# Patient Record
Sex: MALE | Race: BLACK OR AFRICAN AMERICAN | NOT HISPANIC OR LATINO | Employment: FULL TIME | URBAN - METROPOLITAN AREA
[De-identification: names, ages, dates, MRNs, and addresses within clinical notes are randomized per-mention and may not be internally consistent; named-entity substitution may affect disease eponyms.]

---

## 2020-02-28 ENCOUNTER — HOSPITAL ENCOUNTER (EMERGENCY)
Facility: HOSPITAL | Age: 30
Discharge: HOME | End: 2020-02-28
Attending: FAMILY MEDICINE

## 2020-02-28 VITALS
OXYGEN SATURATION: 95 % | SYSTOLIC BLOOD PRESSURE: 145 MMHG | BODY MASS INDEX: 47.83 KG/M2 | DIASTOLIC BLOOD PRESSURE: 70 MMHG | HEIGHT: 65 IN | HEART RATE: 96 BPM | WEIGHT: 287.06 LBS | RESPIRATION RATE: 20 BRPM | TEMPERATURE: 101 F

## 2020-02-28 DIAGNOSIS — H60.502 ACUTE OTITIS EXTERNA OF LEFT EAR, UNSPECIFIED TYPE: Primary | ICD-10-CM

## 2020-02-28 DIAGNOSIS — H65.02 NON-RECURRENT ACUTE SEROUS OTITIS MEDIA OF LEFT EAR: ICD-10-CM

## 2020-02-28 LAB
INFLUENZA A, MOLECULAR: NEGATIVE
INFLUENZA B, MOLECULAR: NEGATIVE
SPECIMEN SOURCE: NORMAL

## 2020-02-28 PROCEDURE — 25000003 PHARM REV CODE 250: Performed by: NURSE PRACTITIONER

## 2020-02-28 PROCEDURE — 96372 THER/PROPH/DIAG INJ SC/IM: CPT

## 2020-02-28 PROCEDURE — 99284 EMERGENCY DEPT VISIT MOD MDM: CPT | Mod: 25

## 2020-02-28 PROCEDURE — 87502 INFLUENZA DNA AMP PROBE: CPT

## 2020-02-28 PROCEDURE — 63600175 PHARM REV CODE 636 W HCPCS: Performed by: NURSE PRACTITIONER

## 2020-02-28 RX ORDER — CEFTRIAXONE 1 G/1
1 INJECTION, POWDER, FOR SOLUTION INTRAMUSCULAR; INTRAVENOUS
Status: COMPLETED | OUTPATIENT
Start: 2020-02-28 | End: 2020-02-28

## 2020-02-28 RX ORDER — CIPROFLOXACIN AND DEXAMETHASONE 3; 1 MG/ML; MG/ML
4 SUSPENSION/ DROPS AURICULAR (OTIC)
Status: COMPLETED | OUTPATIENT
Start: 2020-02-28 | End: 2020-02-28

## 2020-02-28 RX ORDER — AMOXICILLIN AND CLAVULANATE POTASSIUM 875; 125 MG/1; MG/1
1 TABLET, FILM COATED ORAL 2 TIMES DAILY
Qty: 14 TABLET | Refills: 0 | Status: SHIPPED | OUTPATIENT
Start: 2020-02-28 | End: 2023-08-20 | Stop reason: ALTCHOICE

## 2020-02-28 RX ORDER — DICLOFENAC SODIUM 50 MG/1
50 TABLET, DELAYED RELEASE ORAL 3 TIMES DAILY PRN
Qty: 15 TABLET | Refills: 0 | Status: SHIPPED | OUTPATIENT
Start: 2020-02-28 | End: 2021-03-30 | Stop reason: SDUPTHER

## 2020-02-28 RX ORDER — IBUPROFEN 800 MG/1
800 TABLET ORAL
Status: COMPLETED | OUTPATIENT
Start: 2020-02-28 | End: 2020-02-28

## 2020-02-28 RX ORDER — CIPROFLOXACIN AND DEXAMETHASONE 3; 1 MG/ML; MG/ML
4 SUSPENSION/ DROPS AURICULAR (OTIC) 2 TIMES DAILY
Qty: 7.5 ML | Refills: 0 | Status: SHIPPED | OUTPATIENT
Start: 2020-02-28 | End: 2021-03-30 | Stop reason: SDUPTHER

## 2020-02-28 RX ORDER — HYDROCODONE BITARTRATE AND ACETAMINOPHEN 5; 325 MG/1; MG/1
1 TABLET ORAL
Status: COMPLETED | OUTPATIENT
Start: 2020-02-28 | End: 2020-02-28

## 2020-02-28 RX ADMIN — IBUPROFEN 800 MG: 800 TABLET, FILM COATED ORAL at 10:02

## 2020-02-28 RX ADMIN — CEFTRIAXONE SODIUM 1 G: 1 INJECTION, POWDER, FOR SOLUTION INTRAMUSCULAR; INTRAVENOUS at 11:02

## 2020-02-28 RX ADMIN — CIPROFLOXACIN AND DEXAMETHASONE 4 DROP: 3; 1 SUSPENSION/ DROPS AURICULAR (OTIC) at 11:02

## 2020-02-28 RX ADMIN — HYDROCODONE BITARTRATE AND ACETAMINOPHEN 1 TABLET: 5; 325 TABLET ORAL at 10:02

## 2020-02-29 NOTE — ED PROVIDER NOTES
"SCRIBE #1 NOTE: I, Janessa Bocanegra, am scribing for, and in the presence of, Miguel East NP. I have scribed the entire note.      History      Chief Complaint   Patient presents with    Otalgia     L ear. Pt states "I think I ruptured my ear drum." C/O pain and fullness.        Review of patient's allergies indicates:  No Known Allergies     HPI   HPI    2/28/2020, 9:59 PM   History obtained from the patient      History of Present Illness: Pedro Melgar Jr. is a 29 y.o. male patient who presents to the Emergency Department for L ear pain, onset 1 day PTA. Pt states he may have ruptured his L ear drum.  Symptoms are constant and moderate in severity. No mitigating or exacerbating factors reported. Associated sxs include fever. Patient denies any rhinorrhea, cough, SOB, HA, dizziness, and all other sxs at this time. No prior Tx reported. No further complaints or concerns at this time.         Arrival mode: Personal vehicle      PCP: Primary Doctor No       Past Medical History:  History reviewed. No pertinent medical history.     Past Surgical History:  Past Surgical History:   Procedure Laterality Date    MOUTH SURGERY           Family History:  History reviewed. No pertinent family history.     Social History:  Social History     Tobacco Use    Smoking status: Current Some Day Smoker     Types: Cigarettes    Smokeless tobacco: Never Used   Substance and Sexual Activity    Alcohol use: Yes     Alcohol/week: 28.0 standard drinks     Types: 28 Cans of beer per week    Drug use: No    Sexual activity: unknown       ROS   Review of Systems   Constitutional: Positive for fever. Negative for chills.   HENT: Positive for ear pain (left). Negative for congestion, postnasal drip, rhinorrhea and sore throat.    Respiratory: Negative for cough and shortness of breath.    Cardiovascular: Negative for chest pain.   Gastrointestinal: Negative for nausea.   Genitourinary: Negative for dysuria.   Musculoskeletal: " "Negative for back pain.   Skin: Negative for rash.   Neurological: Negative for dizziness, weakness, light-headedness and headaches.   Hematological: Does not bruise/bleed easily.   All other systems reviewed and are negative.      Physical Exam      Initial Vitals [02/28/20 2139]   BP Pulse Resp Temp SpO2   (!) 145/70 96 20 (!) 101 °F (38.3 °C) 95 %      MAP       --          Physical Exam  Nursing Notes and Vital Signs Reviewed.  Constitutional: Patient is in mild distress. Well-developed and well-nourished. Obese. Warm to touch.  Head: Atraumatic. Normocephalic.  Eyes: PERRL. EOM intact. Conjunctivae are not pale. No scleral icterus.  ENT: Mucous membranes are moist. Oropharynx is clear and symmetric.    Ears: Right TM normal. Left ear canal swelling with pus. Erythema to left TM noted. No bulging. No effusion or air-fluid levels. No perforation.   Neck: Supple. Full ROM. No lymphadenopathy.  Cardiovascular: Regular rate. Regular rhythm. No murmurs, rubs, or gallops. Distal pulses are 2+ and symmetric.  Pulmonary/Chest: No respiratory distress. Clear to auscultation bilaterally. No wheezing or rales.  Abdominal: Soft and non-distended.  There is no tenderness.  No rebound, guarding, or rigidity. Good bowel sounds.  Musculoskeletal: Moves all extremities. No obvious deformities. No edema. No calf tenderness.  Skin: Warm and dry.  Neurological:  Alert, awake, and appropriate.  Normal speech.  No acute focal neurological deficits are appreciated.  Psychiatric: Normal affect. Good eye contact. Appropriate in content.    ED Course    Procedures  ED Vital Signs:  Vitals:    02/28/20 2139 02/28/20 2215 02/28/20 2255   BP: (!) 145/70     Pulse: 96     Resp: 20     Temp: (!) 101 °F (38.3 °C) (!) 101 °F (38.3 °C) (P) 100.1 °F (37.8 °C)   TempSrc: Oral     SpO2: 95%     Weight: 130.2 kg (287 lb 0.6 oz)     Height: 5' 5" (1.651 m)         Abnormal Lab Results:  Labs Reviewed   INFLUENZA A & B BY MOLECULAR        All Lab " Results:  Results for orders placed or performed during the hospital encounter of 02/28/20   Influenza A & B by Molecular   Result Value Ref Range    Influenza A, Molecular Negative Negative    Influenza B, Molecular Negative Negative    Flu A & B Source Nasal swab          Imaging Results:  Imaging Results    None                 The Emergency Provider reviewed the vital signs and test results, which are outlined above.    ED Discussion     10:45 PM: Reassessed pt at this time.   Discussed with pt all pertinent ED information and results. Discussed pt dx and plan of tx. Gave pt all f/u and return to the ED instructions. All questions and concerns were addressed at this time. Pt expresses understanding of information and instructions, and is comfortable with plan to discharge. Pt is stable for discharge.    I discussed with patient and/or family/caretaker that evaluation in the ED does not suggest any emergent or life threatening medical conditions requiring immediate intervention beyond what was provided in the ED, and I believe patient is safe for discharge.  Regardless, an unremarkable evaluation in the ED does not preclude the development or presence of a serious of life threatening condition. As such, patient was instructed to return immediately for any worsening or change in current symptoms.           ED Medication(s):  Medications   HYDROcodone-acetaminophen 5-325 mg per tablet 1 tablet (1 tablet Oral Given 2/28/20 2215)   ibuprofen tablet 800 mg (800 mg Oral Given 2/28/20 2215)   cefTRIAXone injection 1 g (1 g Intramuscular Given 2/28/20 2309)   ciprofloxacin-dexamethasone 0.3-0.1% otic suspension 4 drop (4 drops Left Ear Given 2/28/20 2333)       Follow-up Information     Schedule an appointment as soon as possible for a visit  with [facility].    Specialty:  Otolaryngology  Contact information:  [address] [facility] [address] [phone]  Additional  information:  (off [address]) 2nd floor           Schedule an appointment as soon as possible for a visit  with PCP.           [facility]    Specialty:  Emergency Medicine  Why:  If symptoms worsen, As needed  Contact information:  [address]  [facility] [address] [phone]                 Discharge Medication List as of 2/28/2020 11:02 PM      START taking these medications    Details   amoxicillin-clavulanate 875-125mg (AUGMENTIN) 875-125 mg per tablet Take 1 tablet by mouth 2 (two) times daily., Starting Fri 2/28/2020, Print      ciprofloxacin-dexamethasone 0.3-0.1% (CIPRODEX) 0.3-0.1 % DrpS Place 4 drops into the left ear 2 (two) times daily., Starting Fri 2/28/2020, Print      diclofenac (VOLTAREN) 50 MG EC tablet Take 1 tablet (50 mg total) by mouth 3 (three) times daily as needed., Starting Fri 2/28/2020, Print             Medical Decision Making    Medical Decision Making:   Clinical Tests:   Lab Tests: Ordered and Reviewed           Scribe Attestation:   Scribe #1: I performed the above scribed service and the documentation accurately describes the services I performed. I attest to the accuracy of the note.    Attending:   Physician Attestation Statement for Scribe #1: I,Miguel East NP , personally performed the services described in this documentation, as scribed by Janessa Bocanegra, in my presence, and it is both accurate and complete.          Clinical Impression       ICD-10-CM ICD-9-CM   1. Acute otitis externa of left ear, unspecified type H60.502 380.10   2. Non-recurrent acute serous otitis media of left ear H65.02 381.01       Disposition:   Disposition: Discharged  Condition: Stable         Miguel East NP  02/29/20 4785

## 2020-12-23 ENCOUNTER — OFFICE VISIT (OUTPATIENT)
Dept: URGENT CARE | Facility: CLINIC | Age: 30
End: 2020-12-23
Payer: COMMERCIAL

## 2020-12-23 VITALS
TEMPERATURE: 98 F | SYSTOLIC BLOOD PRESSURE: 129 MMHG | RESPIRATION RATE: 16 BRPM | OXYGEN SATURATION: 99 % | HEART RATE: 81 BPM | DIASTOLIC BLOOD PRESSURE: 72 MMHG

## 2020-12-23 DIAGNOSIS — M72.2 PLANTAR FASCIITIS, RIGHT: Primary | ICD-10-CM

## 2020-12-23 PROCEDURE — 99204 OFFICE O/P NEW MOD 45 MIN: CPT | Mod: S$GLB,,, | Performed by: NURSE PRACTITIONER

## 2020-12-23 PROCEDURE — 1125F PR PAIN SEVERITY QUANTIFIED, PAIN PRESENT: ICD-10-PCS | Mod: S$GLB,,, | Performed by: NURSE PRACTITIONER

## 2020-12-23 PROCEDURE — 99204 PR OFFICE/OUTPT VISIT, NEW, LEVL IV, 45-59 MIN: ICD-10-PCS | Mod: S$GLB,,, | Performed by: NURSE PRACTITIONER

## 2020-12-23 PROCEDURE — 1125F AMNT PAIN NOTED PAIN PRSNT: CPT | Mod: S$GLB,,, | Performed by: NURSE PRACTITIONER

## 2020-12-23 RX ORDER — INDOMETHACIN 75 MG/1
75 CAPSULE, EXTENDED RELEASE ORAL 2 TIMES DAILY
Qty: 20 CAPSULE | Refills: 0 | Status: SHIPPED | OUTPATIENT
Start: 2020-12-23 | End: 2021-01-02

## 2020-12-23 RX ORDER — INDOMETHACIN 75 MG/1
75 CAPSULE, EXTENDED RELEASE ORAL 2 TIMES DAILY
COMMUNITY

## 2020-12-23 NOTE — PROGRESS NOTES
Subjective:       Patient ID: Pedro Costa is a 30 y.o. male.    Vitals:  temporal temperature is 97.7 °F (36.5 °C). His blood pressure is 129/72 and his pulse is 81. His respiration is 16 and oxygen saturation is 99%.     Chief Complaint: Plantar Fasciitis    Right foot pain    Other  This is a new problem. The current episode started in the past 7 days. Pertinent negatives include no arthralgias, chest pain, chills, congestion, coughing, fatigue, fever, headaches, joint swelling, myalgias, nausea, rash, sore throat, vertigo or vomiting. Treatments tried: indomethacin. The treatment provided mild relief.       Constitution: Negative for chills, fatigue and fever.   HENT: Negative for congestion and sore throat.    Neck: Negative for painful lymph nodes.   Cardiovascular: Negative for chest pain and leg swelling.   Eyes: Negative for double vision and blurred vision.   Respiratory: Negative for cough and shortness of breath.    Gastrointestinal: Negative for nausea, vomiting and diarrhea.   Genitourinary: Negative for dysuria, frequency and urgency.   Musculoskeletal: Positive for pain (foot). Negative for joint pain, joint swelling, muscle cramps and muscle ache.   Skin: Negative for color change, pale and rash.   Allergic/Immunologic: Negative for seasonal allergies.   Neurological: Negative for dizziness, history of vertigo, light-headedness, passing out and headaches.   Hematologic/Lymphatic: Negative for swollen lymph nodes, easy bruising/bleeding and history of blood clots. Does not bruise/bleed easily.   Psychiatric/Behavioral: Negative for nervous/anxious, sleep disturbance and depression. The patient is not nervous/anxious.        Objective:      Physical Exam   Constitutional: He is oriented to person, place, and time. He appears well-developed. He is cooperative. No distress.   HENT:   Head: Normocephalic and atraumatic.   Nose: Nose normal.   Mouth/Throat: Oropharynx is clear and moist and mucous  membranes are normal.   Eyes: Conjunctivae and lids are normal.   Neck: Trachea normal, normal range of motion, full passive range of motion without pain and phonation normal. Neck supple.   Cardiovascular: Normal rate, regular rhythm, normal heart sounds and normal pulses.   Pulmonary/Chest: Effort normal and breath sounds normal. No respiratory distress. He has no decreased breath sounds.   Abdominal: Normal appearance.   Musculoskeletal:         General: No deformity.      Right foot: No swelling.        Feet:    Neurological: He is alert and oriented to person, place, and time. He has normal strength and normal reflexes. No sensory deficit.   Skin: Skin is warm, dry, intact and not diaphoretic. Psychiatric: His speech is normal and behavior is normal. Judgment and thought content normal.   Nursing note and vitals reviewed.        Assessment:       1. Plantar fasciitis, right        Plan:         Plantar fasciitis, right  -     indomethacin (INDOCIN SR) 75 mg CpSR CR capsule; Take 1 capsule (75 mg total) by mouth 2 (two) times daily. for 10 days  Dispense: 20 capsule; Refill: 0         Home care  · If you are overweight, lose weight to help healing.  · Choose supportive shoes with good arch support and shock absorbency. Replace athletic shoes when they become worn out. Dont walk or run barefoot.  · Premade or custom-fitted shoe inserts may be helpful. Inserts made of silicone seem to be the most effective. Custom-made inserts can be provided by a podiatrist or foot specialist, physical therapist, or orthopedist.  · Premade or custom-made night splints keep the heel stretched out while you sleep. They may prevent morning pain.  · Avoid activities that stress the feet: jogging, prolonged standing or walking, contact sports, etc.  · First thing in the morning and before sports, stretch the bottom of your feet. Gently flex your ankle so the toes move toward your knee.  · Icing may help control heel pain. Apply an  ice pack to the heel for 10-20 minutes as a preventive. Or ice your heel after a severe flare-up of symptoms. You may repeat this every 1-2 hours as needed.  · You may use over-the-counter pain medicine to control pain, unless another medicine was prescribed. Anti-inflammatory pain medicines, such as ibuprofen or naproxen, may work better than acetaminophen. If you have chronic liver or kidney disease or ever had a stomach ulcer or GI bleeding, talk with your healthcare provider before using these medicines.    Follow up with PCP or podiatry if symptoms worsen or don't improve with treatment.

## 2020-12-27 ENCOUNTER — TELEPHONE (OUTPATIENT)
Dept: URGENT CARE | Facility: CLINIC | Age: 30
End: 2020-12-27

## 2021-03-30 ENCOUNTER — HOSPITAL ENCOUNTER (EMERGENCY)
Facility: HOSPITAL | Age: 31
Discharge: HOME | End: 2021-03-30
Attending: EMERGENCY MEDICINE

## 2021-03-30 VITALS
WEIGHT: 297.63 LBS | HEART RATE: 89 BPM | HEIGHT: 65 IN | SYSTOLIC BLOOD PRESSURE: 152 MMHG | TEMPERATURE: 99 F | OXYGEN SATURATION: 95 % | RESPIRATION RATE: 18 BRPM | DIASTOLIC BLOOD PRESSURE: 76 MMHG | BODY MASS INDEX: 49.59 KG/M2

## 2021-03-30 DIAGNOSIS — H60.502 ACUTE OTITIS EXTERNA OF LEFT EAR, UNSPECIFIED TYPE: Primary | ICD-10-CM

## 2021-03-30 DIAGNOSIS — H92.02 LEFT EAR PAIN: ICD-10-CM

## 2021-03-30 PROCEDURE — 99284 EMERGENCY DEPT VISIT MOD MDM: CPT

## 2021-03-30 RX ORDER — CIPROFLOXACIN AND DEXAMETHASONE 3; 1 MG/ML; MG/ML
4 SUSPENSION/ DROPS AURICULAR (OTIC) 2 TIMES DAILY
Qty: 7.5 ML | Refills: 0 | Status: SHIPPED | OUTPATIENT
Start: 2021-03-30

## 2021-03-30 RX ORDER — DICLOFENAC SODIUM 50 MG/1
50 TABLET, DELAYED RELEASE ORAL 3 TIMES DAILY PRN
Qty: 15 TABLET | Refills: 0 | Status: SHIPPED | OUTPATIENT
Start: 2021-03-30

## 2021-07-22 ENCOUNTER — OFFICE VISIT (OUTPATIENT)
Dept: URGENT CARE | Facility: CLINIC | Age: 31
End: 2021-07-22
Payer: COMMERCIAL

## 2021-07-22 VITALS
DIASTOLIC BLOOD PRESSURE: 86 MMHG | OXYGEN SATURATION: 96 % | RESPIRATION RATE: 20 BRPM | TEMPERATURE: 99 F | HEART RATE: 99 BPM | SYSTOLIC BLOOD PRESSURE: 142 MMHG

## 2021-07-22 DIAGNOSIS — J06.9 UPPER RESPIRATORY INFECTION WITH COUGH AND CONGESTION: Primary | ICD-10-CM

## 2021-07-22 DIAGNOSIS — R50.9 FEVER, UNSPECIFIED FEVER CAUSE: ICD-10-CM

## 2021-07-22 LAB
CTP QC/QA: YES
SARS-COV-2 RDRP RESP QL NAA+PROBE: NEGATIVE

## 2021-07-22 PROCEDURE — 99214 PR OFFICE/OUTPT VISIT, EST, LEVL IV, 30-39 MIN: ICD-10-PCS | Mod: S$GLB,,, | Performed by: NURSE PRACTITIONER

## 2021-07-22 PROCEDURE — 1126F PR PAIN SEVERITY QUANTIFIED, NO PAIN PRESENT: ICD-10-PCS | Mod: CPTII,S$GLB,, | Performed by: NURSE PRACTITIONER

## 2021-07-22 PROCEDURE — 99214 OFFICE O/P EST MOD 30 MIN: CPT | Mod: S$GLB,,, | Performed by: NURSE PRACTITIONER

## 2021-07-22 PROCEDURE — U0002: ICD-10-PCS | Mod: QW,S$GLB,, | Performed by: NURSE PRACTITIONER

## 2021-07-22 PROCEDURE — 1126F AMNT PAIN NOTED NONE PRSNT: CPT | Mod: CPTII,S$GLB,, | Performed by: NURSE PRACTITIONER

## 2021-07-22 PROCEDURE — U0002 COVID-19 LAB TEST NON-CDC: HCPCS | Mod: QW,S$GLB,, | Performed by: NURSE PRACTITIONER

## 2021-07-22 RX ORDER — AZITHROMYCIN 250 MG/1
TABLET, FILM COATED ORAL
Qty: 6 TABLET | Refills: 0 | Status: SHIPPED | OUTPATIENT
Start: 2021-07-22

## 2022-04-15 ENCOUNTER — OFFICE VISIT (OUTPATIENT)
Dept: URGENT CARE | Facility: CLINIC | Age: 32
End: 2022-04-15
Payer: COMMERCIAL

## 2022-04-15 VITALS
WEIGHT: 260 LBS | HEIGHT: 67 IN | SYSTOLIC BLOOD PRESSURE: 125 MMHG | OXYGEN SATURATION: 97 % | HEART RATE: 78 BPM | BODY MASS INDEX: 40.81 KG/M2 | RESPIRATION RATE: 16 BRPM | TEMPERATURE: 97 F | DIASTOLIC BLOOD PRESSURE: 86 MMHG

## 2022-04-15 DIAGNOSIS — Z11.3 SCREENING EXAMINATION FOR STD (SEXUALLY TRANSMITTED DISEASE): Primary | ICD-10-CM

## 2022-04-15 PROCEDURE — 3074F PR MOST RECENT SYSTOLIC BLOOD PRESSURE < 130 MM HG: ICD-10-PCS | Mod: CPTII,S$GLB,, | Performed by: PHYSICIAN ASSISTANT

## 2022-04-15 PROCEDURE — 36415 COLL VENOUS BLD VENIPUNCTURE: CPT | Performed by: PHYSICIAN ASSISTANT

## 2022-04-15 PROCEDURE — 3074F SYST BP LT 130 MM HG: CPT | Mod: CPTII,S$GLB,, | Performed by: PHYSICIAN ASSISTANT

## 2022-04-15 PROCEDURE — 3079F PR MOST RECENT DIASTOLIC BLOOD PRESSURE 80-89 MM HG: ICD-10-PCS | Mod: CPTII,S$GLB,, | Performed by: PHYSICIAN ASSISTANT

## 2022-04-15 PROCEDURE — 1160F RVW MEDS BY RX/DR IN RCRD: CPT | Mod: CPTII,S$GLB,, | Performed by: PHYSICIAN ASSISTANT

## 2022-04-15 PROCEDURE — 87491 CHLMYD TRACH DNA AMP PROBE: CPT | Performed by: PHYSICIAN ASSISTANT

## 2022-04-15 PROCEDURE — 99214 OFFICE O/P EST MOD 30 MIN: CPT | Mod: S$GLB,,, | Performed by: PHYSICIAN ASSISTANT

## 2022-04-15 PROCEDURE — 86592 SYPHILIS TEST NON-TREP QUAL: CPT | Performed by: PHYSICIAN ASSISTANT

## 2022-04-15 PROCEDURE — 3079F DIAST BP 80-89 MM HG: CPT | Mod: CPTII,S$GLB,, | Performed by: PHYSICIAN ASSISTANT

## 2022-04-15 PROCEDURE — 1159F MED LIST DOCD IN RCRD: CPT | Mod: CPTII,S$GLB,, | Performed by: PHYSICIAN ASSISTANT

## 2022-04-15 PROCEDURE — 3008F BODY MASS INDEX DOCD: CPT | Mod: CPTII,S$GLB,, | Performed by: PHYSICIAN ASSISTANT

## 2022-04-15 PROCEDURE — 99214 PR OFFICE/OUTPT VISIT, EST, LEVL IV, 30-39 MIN: ICD-10-PCS | Mod: S$GLB,,, | Performed by: PHYSICIAN ASSISTANT

## 2022-04-15 PROCEDURE — 3008F PR BODY MASS INDEX (BMI) DOCUMENTED: ICD-10-PCS | Mod: CPTII,S$GLB,, | Performed by: PHYSICIAN ASSISTANT

## 2022-04-15 PROCEDURE — 87529 HSV DNA AMP PROBE: CPT | Performed by: PHYSICIAN ASSISTANT

## 2022-04-15 PROCEDURE — 1159F PR MEDICATION LIST DOCUMENTED IN MEDICAL RECORD: ICD-10-PCS | Mod: CPTII,S$GLB,, | Performed by: PHYSICIAN ASSISTANT

## 2022-04-15 PROCEDURE — 87591 N.GONORRHOEAE DNA AMP PROB: CPT | Performed by: PHYSICIAN ASSISTANT

## 2022-04-15 PROCEDURE — 87389 HIV-1 AG W/HIV-1&-2 AB AG IA: CPT | Performed by: PHYSICIAN ASSISTANT

## 2022-04-15 PROCEDURE — 1160F PR REVIEW ALL MEDS BY PRESCRIBER/CLIN PHARMACIST DOCUMENTED: ICD-10-PCS | Mod: CPTII,S$GLB,, | Performed by: PHYSICIAN ASSISTANT

## 2022-04-15 RX ORDER — AMLODIPINE BESYLATE 5 MG/1
TABLET ORAL
COMMUNITY
Start: 2021-11-24

## 2022-04-15 NOTE — PATIENT INSTRUCTIONS
- We will call you with test results within about the next week  - Please use condoms for future sexual intercourse  - Please seek medical attention if you begin to experience any new symptoms.     Increase condom use to prevent infection & Notify sexual partners of the need for testing if you are positive.       For those who have high risk sexual behaviors and are on Truvada for PrEP- you have additional protection against HIV ONLY.  REMEMBER WEAR CONDOMS AND GET TESTED OFTEN.       - You must understand that you have received an Urgent Care treatment only and that you may be released before all of your medical problems are known or treated.   - You, the patient, will arrange for follow up care as instructed with your primary care provider or recommended specialist.   - If your condition worsens or fails to improve we recommend that you receive another evaluation at the ER immediately or contact your PCP to discuss your concerns, or return here.   - Please do not drive or make any important decisions for 24 hours if you have received any pain medications, sedatives or mood altering drugs during your visit.    Disclaimer: This document was drafted with the use of a voice recognition device and is likely to have sound alike errors.

## 2022-04-15 NOTE — PROGRESS NOTES
"Subjective:       Patient ID: Pedro Costa is a 31 y.o. male.    Vitals:  height is 5' 7" (1.702 m) and weight is 117.9 kg (260 lb). His temperature is 97.4 °F (36.3 °C). His blood pressure is 125/86 and his pulse is 78. His respiration is 16 and oxygen saturation is 97%.     Chief Complaint: Annual Exam    31-year-old male presents to urgent care for full STD screening.  enies any known exposures or symptoms.  Reports that he just would like to know his status.        Genitourinary: Negative.  Negative for dysuria, frequency, urgency, urine decreased, flank pain, bladder incontinence, bed wetting, hematuria, history of kidney stones, genital trauma, painful intercourse, genital sore, penile discharge, painful ejaculation, penile pain, penile swelling, scrotal swelling, testicular pain and pelvic pain.       Objective:       Vitals:    04/15/22 1115   BP: 125/86   Pulse: 78   Resp: 16   Temp: 97.4 °F (36.3 °C)   SpO2: 97%   Weight: 117.9 kg (260 lb)   Height: 5' 7" (1.702 m)       Physical Exam   Constitutional: He is oriented to person, place, and time.  Non-toxic appearance. He does not appear ill. No distress. obesity  HENT:   Head: Normocephalic and atraumatic.   Ears:   Right Ear: External ear normal.   Left Ear: External ear normal.   Nose: Nose normal.   Eyes: Conjunctivae are normal. Pupils are equal, round, and reactive to light. No scleral icterus.      extraocular movement intact   Neck: Neck supple.   Cardiovascular: Normal rate, regular rhythm, normal heart sounds and normal pulses.   Pulmonary/Chest: Effort normal and breath sounds normal.   Abdominal: Normal appearance.   Genitourinary:         Comments: declined     Neurological: no focal deficit. He is alert, oriented to person, place, and time and at baseline.   Skin: Skin is warm, dry, not diaphoretic and no rash. Capillary refill takes less than 2 seconds.   Psychiatric: His behavior is normal. Mood, judgment and thought content normal. "   Vitals reviewed.        Assessment:       1. Screening examination for STD (sexually transmitted disease)          Plan:         Screening examination for STD (sexually transmitted disease)  -     C. trachomatis/N. gonorrhoeae by AMP DNA [facility]; Urine  -     HIV 1/2 Ag/Ab (4th Gen)  -     Herpes simplex Virus (HSV) Type 1 & 2 DNA by PCR  -     RPR                 Patient Instructions       - We will call you with test results within about the next week  - Please use condoms for future sexual intercourse  - Please seek medical attention if you begin to experience any new symptoms.     Increase condom use to prevent infection & Notify sexual partners of the need for testing if you are positive.       For those who have high risk sexual behaviors and are on Truvada for PrEP- you have additional protection against HIV ONLY.  REMEMBER WEAR CONDOMS AND GET TESTED OFTEN.       - You must understand that you have received an Urgent Care treatment only and that you may be released before all of your medical problems are known or treated.   - You, the patient, will arrange for follow up care as instructed with your primary care provider or recommended specialist.   - If your condition worsens or fails to improve we recommend that you receive another evaluation at the ER immediately or contact your PCP to discuss your concerns, or return here.   - Please do not drive or make any important decisions for 24 hours if you have received any pain medications, sedatives or mood altering drugs during your visit.    Disclaimer: This document was drafted with the use of a voice recognition device and is likely to have sound alike errors.

## 2022-04-18 LAB
HIV 1+2 AB+HIV1 P24 AG SERPL QL IA: NEGATIVE
RPR SER QL: NORMAL

## 2022-04-19 LAB
C TRACH DNA SPEC QL NAA+PROBE: NOT DETECTED
N GONORRHOEA DNA SPEC QL NAA+PROBE: NOT DETECTED

## 2022-04-20 ENCOUNTER — TELEPHONE (OUTPATIENT)
Dept: URGENT CARE | Facility: CLINIC | Age: 32
End: 2022-04-20
Payer: COMMERCIAL

## 2022-04-20 LAB
HSV-1 DNA BY PCR: NEGATIVE
HSV-2 DNA BY PCR: NEGATIVE

## 2022-04-20 NOTE — TELEPHONE ENCOUNTER
Full STD panel negative.  Attempted to call patient and notify of results below.    No answer, voicemail left to return call to urgent care.      Results for orders placed or performed in visit on 04/15/22   C. trachomatis/N. gonorrhoeae by AMP DNA [facility]; Urine    Specimen: Genital   Result Value Ref Range    Chlamydia, Amplified DNA Not Detected Not Detected    N gonorrhoeae, amplified DNA Not Detected Not Detected   HIV 1/2 Ag/Ab (4th Gen)   Result Value Ref Range    HIV 1/2 Ag/Ab Negative Negative   Herpes simplex Virus (HSV) Type 1 & 2 DNA by PCR   Result Value Ref Range    HSV-1 DNA by PCR Negative Negative    HSV-2 DNA by PCR Negative Negative   RPR   Result Value Ref Range    RPR Non-reactive Non-reactive

## 2022-05-30 ENCOUNTER — HOSPITAL ENCOUNTER (EMERGENCY)
Facility: HOSPITAL | Age: 32
Discharge: HOME | End: 2022-05-30
Attending: EMERGENCY MEDICINE

## 2022-05-30 VITALS
DIASTOLIC BLOOD PRESSURE: 84 MMHG | HEART RATE: 79 BPM | TEMPERATURE: 99 F | WEIGHT: 281.06 LBS | HEIGHT: 64 IN | BODY MASS INDEX: 47.98 KG/M2 | SYSTOLIC BLOOD PRESSURE: 148 MMHG | OXYGEN SATURATION: 97 % | RESPIRATION RATE: 18 BRPM

## 2022-05-30 DIAGNOSIS — G51.0 BELL'S PALSY: Primary | ICD-10-CM

## 2022-05-30 PROCEDURE — 99284 EMERGENCY DEPT VISIT MOD MDM: CPT | Mod: 25

## 2022-05-30 RX ORDER — PREDNISONE 20 MG/1
40 TABLET ORAL DAILY
Qty: 10 TABLET | Refills: 0 | Status: SHIPPED | OUTPATIENT
Start: 2022-05-30 | End: 2022-06-04

## 2022-05-30 NOTE — ED PROVIDER NOTES
SCRIBE #1 NOTE: I, Dana Horan, am scribing for, and in the presence of, Jason Hough Jr., MD. I have scribed the entire note.       History     Chief Complaint   Patient presents with    left side facial numbness     Pt has had left side facial numbness x 3 days. There is no facial droop, but when he smiles, there is a difference. No slurred speech. No arm drift.     Review of patient's allergies indicates:  No Known Allergies      History of Present Illness     HPI    5/30/2022, 10:13 AM  History obtained from the patient and wife      History of Present Illness: Pedro Melgar Jr. is a 32 y.o. male patient who presents to the Emergency Department for evaluation of left sided facial numbness which onset gradually 3 days ago. Pt reports that it is harder to move the left side of his face. There is no facial droop, but pt can notice a difference when smiling or when closing his eyes. Pt's wife reports that when the sxs first began, pt had slurred speech but it has resolved.  Symptoms are constant and moderate in severity. No mitigating or exacerbating factors reported. Associated sxs include drooling and taste change. Patient denies any fever, nausea, SOB, CP, weakness, and all other sxs at this time. No further complaints or concerns at this time.       Arrival mode: Personal vehicle    PCP: Primary Doctor No        Past Medical History:  No past medical history on file.    Past Surgical History:  Past Surgical History:   Procedure Laterality Date    MOUTH SURGERY           Family History:  No family history on file.    Social History:  Social History     Tobacco Use    Smoking status: Current Some Day Smoker     Types: Cigarettes    Smokeless tobacco: Never Used   Substance and Sexual Activity    Alcohol use: Yes     Alcohol/week: 28.0 standard drinks     Types: 28 Cans of beer per week    Drug use: No    Sexual activity: Not on file        Review of Systems     Review of Systems   Constitutional:  Negative for fever.        (+) Taste change   HENT: Positive for drooling. Negative for sore throat.    Respiratory: Negative for shortness of breath.    Cardiovascular: Negative for chest pain.   Gastrointestinal: Negative for nausea.   Genitourinary: Negative for dysuria.   Musculoskeletal: Negative for back pain.   Skin: Negative for rash.   Neurological: Positive for numbness (Left side of face). Negative for weakness.   Hematological: Does not bruise/bleed easily.   All other systems reviewed and are negative.       Physical Exam     Initial Vitals [05/30/22 0939]   BP Pulse Resp Temp SpO2   136/83 78 18 99.1 °F (37.3 °C) 97 %      MAP       --          Physical Exam  Nursing Notes and Vital Signs Reviewed.  Constitutional: Patient is in no acute distress. Well-developed and well-nourished.  Head: Atraumatic. Normocephalic.  Eyes: EOM intact. Conjunctivae are not pale. No scleral icterus.  ENT: Mucous membranes are moist. Oropharynx is clear and symmetric.    Neck: Supple. Full ROM.   Cardiovascular: Regular rate. Regular rhythm. No murmurs, rubs, or gallops. Distal pulses are 2+ and symmetric.  Pulmonary/Chest: No respiratory distress. Clear to auscultation bilaterally. No wheezing or rales.  Abdominal: Soft and non-distended.  There is no tenderness.  No rebound, guarding, or rigidity.   Musculoskeletal: Moves all extremities. No obvious deformities. No edema.   Skin: Warm and dry.  Neurological:  Alert, awake, and appropriate.  Normal speech. Paralysis of left forehead. Lid lag in left eye. Wasn't able to fully close left eye when compared to to the right. Bunking left nasolabial fold. Normal strength to extremities  Psychiatric: Normal affect. Good eye contact. Appropriate in content.     ED Course   Procedures  ED Vital Signs:  Vitals:    05/30/22 0939 05/30/22 1015 05/30/22 1115   BP: 136/83 (!) 156/88 (!) 148/84   Pulse: 78 72 79   Resp: 18 18 18   Temp: 99.1 °F (37.3 °C)  98.9 °F (37.2 °C)   TempSrc:  "Oral  Oral   SpO2: 97% 96% 97%   Weight: 127.5 kg (281 lb 1.4 oz)     Height: 5' 4" (1.626 m)           Imaging Results:  Imaging Results          CT Head Without Contrast (Final result)  Result time 05/30/22 10:44:50    Final result by Yosvany Whitley MD (05/30/22 10:44:50)                 Impression:      1.  Negative for acute intracranial process. Negative for hemorrhage, or skull fracture.    All CT scans at this facility are performed  using dose modulation techniques as appropriate to performed exam including the following:  automated exposure control; adjustment of mA and/or kV according to the patients size (this includes techniques or standardized protocols for targeted exams where dose is matched to indication/reason for exam: i.e. extremities or head);  iterative reconstruction technique.      Electronically signed by: Yosvany Whitley MD  Date:    05/30/2022  Time:    10:44             Narrative:    EXAMINATION:  CT HEAD WITHOUT CONTRAST    CLINICAL HISTORY:  Neuro deficit, acute, stroke suspected;    TECHNIQUE:  Axial images through the brain and posterior fossa were obtained without the use of IV contrast.  Sagittal and coronal reconstructions are provided for review.    COMPARISON:  No comparison studies are available.    FINDINGS:  The ventricles are midline and the CSF spaces are normal. The gray-white matter junction is well preserved. Negative for intracranial vascular abnormalities. Negative for mass, mass effect, cerebral edema, hemorrhage or abnormal fluid collections.    The skull and scalp are  intact.    The   paranasal sinuses, mastoid air cells, middle ears and ear canals are clear. The globes are intact.                                          The Emergency Provider reviewed the vital signs and test results, which are outlined above.     ED Discussion     11:07 AM: Reassessed pt at this time. Informed patient that he is beyond the initial window for Bell's Palsy treatment. Will still treat " with steroids, but informed pt that it may have a minimal affect. Giving pt Lacryl for his eyes to prevent corneal abrasions when he sleeps. Discussed with pt all pertinent ED information and results. Discussed pt dx and plan of tx. Gave pt all f/u and return to the ED instructions. All questions and concerns were addressed at this time. Pt expresses understanding of information and instructions, and is comfortable with plan to discharge. Pt is stable for discharge.    I discussed with patient and/or family/caretaker that evaluation in the ED does not suggest any emergent or life threatening medical conditions requiring immediate intervention beyond what was provided in the ED, and I believe patient is safe for discharge.  Regardless, an unremarkable evaluation in the ED does not preclude the development or presence of a serious of life threatening condition. As such, patient was instructed to return immediately for any worsening or change in current symptoms.           Medical Decision Making:   Clinical Tests:   Radiological Study: Ordered and Reviewed           ED Medication(s):  Medications - No data to display    Discharge Medication List as of 5/30/2022 11:07 AM      START taking these medications    Details   predniSONE (DELTASONE) 20 MG tablet Take 2 tablets (40 mg total) by mouth once daily. for 5 days, Starting Mon 5/30/2022, Until Sat 6/4/2022, Print              Follow-up Information     PCP. Call in 2 days.           [facility] - Emergency Dept..    Specialty: Emergency Medicine  Why: If symptoms worsen  Contact information:  [address] [facility] [address] [phone]                           Scribe Attestation:   Scribe #1: I performed the above scribed service and the documentation accurately describes the services I performed. I attest to the accuracy of the note.     Attending:   Physician Attestation Statement for Scribe #1: I, Jason Hough Jr., MD, personally  performed the services described in this documentation, as scribed by Dana Horan, in my presence, and it is both accurate and complete.           Clinical Impression       ICD-10-CM ICD-9-CM   1. Bell's palsy  G51.0 351.0       Disposition:   Disposition: Discharged  Condition: Stable         Jason Hough Jr., MD  05/30/22 2927

## 2022-05-30 NOTE — Clinical Note
"Pedro Cooper" Ramón was seen and treated in our emergency department on 5/30/2022.  He may return to work on 05/31/2022.       If you have any questions or concerns, please don't hesitate to call.      Elly Ng RN    "

## 2023-03-24 ENCOUNTER — OFFICE VISIT (OUTPATIENT)
Dept: URGENT CARE | Facility: CLINIC | Age: 33
End: 2023-03-24
Payer: COMMERCIAL

## 2023-03-24 VITALS
OXYGEN SATURATION: 96 % | RESPIRATION RATE: 18 BRPM | BODY MASS INDEX: 41.59 KG/M2 | HEIGHT: 67 IN | HEART RATE: 83 BPM | WEIGHT: 265 LBS | TEMPERATURE: 98 F | DIASTOLIC BLOOD PRESSURE: 82 MMHG | SYSTOLIC BLOOD PRESSURE: 134 MMHG

## 2023-03-24 DIAGNOSIS — R05.8 COUGH WITH CONGESTION OF PARANASAL SINUS: ICD-10-CM

## 2023-03-24 DIAGNOSIS — R09.81 COUGH WITH CONGESTION OF PARANASAL SINUS: ICD-10-CM

## 2023-03-24 DIAGNOSIS — J34.89 SINUS PRESSURE: ICD-10-CM

## 2023-03-24 DIAGNOSIS — R53.83 MALAISE AND FATIGUE: ICD-10-CM

## 2023-03-24 DIAGNOSIS — J34.9 SINUS PROBLEM: Primary | ICD-10-CM

## 2023-03-24 DIAGNOSIS — R53.81 MALAISE AND FATIGUE: ICD-10-CM

## 2023-03-24 DIAGNOSIS — R51.9 SINUS HEADACHE: ICD-10-CM

## 2023-03-24 DIAGNOSIS — H04.203 WATERY EYES: ICD-10-CM

## 2023-03-24 LAB
CTP QC/QA: YES
SARS-COV-2 AG RESP QL IA.RAPID: NEGATIVE

## 2023-03-24 PROCEDURE — 87811 SARS CORONAVIRUS 2 ANTIGEN POCT, MANUAL READ: ICD-10-PCS | Mod: QW,S$GLB,, | Performed by: PHYSICIAN ASSISTANT

## 2023-03-24 PROCEDURE — 96372 PR INJECTION,THERAP/PROPH/DIAG2ST, IM OR SUBCUT: ICD-10-PCS | Mod: S$GLB,,, | Performed by: EMERGENCY MEDICINE

## 2023-03-24 PROCEDURE — 99214 OFFICE O/P EST MOD 30 MIN: CPT | Mod: 25,S$GLB,, | Performed by: PHYSICIAN ASSISTANT

## 2023-03-24 PROCEDURE — 87811 SARS-COV-2 COVID19 W/OPTIC: CPT | Mod: QW,S$GLB,, | Performed by: PHYSICIAN ASSISTANT

## 2023-03-24 PROCEDURE — 96372 THER/PROPH/DIAG INJ SC/IM: CPT | Mod: S$GLB,,, | Performed by: EMERGENCY MEDICINE

## 2023-03-24 PROCEDURE — 99214 PR OFFICE/OUTPT VISIT, EST, LEVL IV, 30-39 MIN: ICD-10-PCS | Mod: 25,S$GLB,, | Performed by: PHYSICIAN ASSISTANT

## 2023-03-24 RX ORDER — PROMETHAZINE HYDROCHLORIDE AND DEXTROMETHORPHAN HYDROBROMIDE 6.25; 15 MG/5ML; MG/5ML
5 SYRUP ORAL EVERY 6 HOURS PRN
Qty: 118 ML | Refills: 0 | Status: SHIPPED | OUTPATIENT
Start: 2023-03-24 | End: 2023-03-31

## 2023-03-24 RX ORDER — AZELASTINE 1 MG/ML
1 SPRAY, METERED NASAL 2 TIMES DAILY
Qty: 30 ML | Refills: 0 | Status: SHIPPED | OUTPATIENT
Start: 2023-03-24 | End: 2023-04-23

## 2023-03-24 RX ORDER — DEXAMETHASONE SODIUM PHOSPHATE 100 MG/10ML
10 INJECTION INTRAMUSCULAR; INTRAVENOUS ONCE
Status: DISCONTINUED | OUTPATIENT
Start: 2023-03-24 | End: 2023-03-24

## 2023-03-24 RX ORDER — AMLODIPINE BESYLATE 5 MG/1
1 TABLET ORAL EVERY MORNING
COMMUNITY
Start: 2022-11-18

## 2023-03-24 RX ORDER — BENZONATATE 100 MG/1
200 CAPSULE ORAL 3 TIMES DAILY PRN
Qty: 21 CAPSULE | Refills: 0 | Status: SHIPPED | OUTPATIENT
Start: 2023-03-24 | End: 2023-03-31

## 2023-03-24 RX ORDER — DEXAMETHASONE SODIUM PHOSPHATE 100 MG/10ML
10 INJECTION INTRAMUSCULAR; INTRAVENOUS ONCE
Status: COMPLETED | OUTPATIENT
Start: 2023-03-24 | End: 2023-03-24

## 2023-03-24 RX ADMIN — DEXAMETHASONE SODIUM PHOSPHATE 10 MG: 100 INJECTION INTRAMUSCULAR; INTRAVENOUS at 07:03

## 2023-03-25 NOTE — PROGRESS NOTES
"Subjective:       Patient ID: Pedro Costa is a 32 y.o. male.    Vitals:  height is 5' 7" (1.702 m) and weight is 120.2 kg (265 lb). His tympanic temperature is 97.7 °F (36.5 °C). His blood pressure is 134/82 and his pulse is 83. His respiration is 18 and oxygen saturation is 96%.     Chief Complaint: Sinus Problem    Onset 3 weeks ago  Symptoms worsened on Monday    Sinus Problem  This is a new problem. The current episode started 1 to 4 weeks ago. The problem has been gradually worsening since onset. There has been no fever. The fever has been present for Less than 1 day. His pain is at a severity of 0/10. He is experiencing no pain. Associated symptoms include congestion, coughing, headaches, a hoarse voice, sinus pressure and sneezing. Pertinent negatives include no chills, diaphoresis, ear pain, neck pain, shortness of breath, sore throat or swollen glands. Past treatments include oral decongestants and nasal decongestants (Zyrtec). The treatment provided mild relief.     Constitution: Negative for chills and sweating.   HENT:  Positive for congestion and sinus pressure. Negative for ear pain and sore throat.    Neck: Negative for neck pain.   Respiratory:  Positive for cough. Negative for shortness of breath.    Skin:  Negative for erythema.   Allergic/Immunologic: Positive for sneezing.   Neurological:  Positive for headaches.     Objective:      Vitals:    03/24/23 1906   BP: 134/82   Pulse: 83   Resp: 18   Temp: 97.7 °F (36.5 °C)      Physical Exam   Constitutional: He is oriented to person, place, and time. He appears well-developed. He is cooperative.  Non-toxic appearance. He appears ill. No distress. awake  HENT:   Head: Normocephalic and atraumatic. Head is without raccoon's eyes and without Lake's sign.   Ears:   Right Ear: Hearing and external ear normal. Tympanic membrane is erythematous. A middle ear effusion is present. impacted cerumen  Left Ear: Hearing and external ear normal. Tympanic " membrane is erythematous. A middle ear effusion is present. impacted cerumen  Nose: Congestion present. No mucosal edema, rhinorrhea or nasal deformity. No epistaxis. Right sinus exhibits maxillary sinus tenderness and frontal sinus tenderness. Left sinus exhibits maxillary sinus tenderness and frontal sinus tenderness.   Mouth/Throat: Uvula is midline and mucous membranes are normal. Mucous membranes are moist. No trismus in the jaw. Normal dentition. No uvula swelling. Posterior oropharyngeal erythema and cobblestoning present. No oropharyngeal exudate, posterior oropharyngeal edema or tonsillar abscesses. Tonsils are 1+ on the right. Tonsils are 1+ on the left. No tonsillar exudate. Oropharynx is clear.      Comments: PND  Eyes: Conjunctivae, EOM and lids are normal. Pupils are equal, round, and reactive to light. Right eye exhibits discharge. Left eye exhibits discharge. No scleral icterus. Right eye exhibits normal extraocular motion and no nystagmus. Left eye exhibits normal extraocular motion and no nystagmus. Extraocular movement intact vision grossly intact gaze aligned appropriately periorbital hyperpigmentation      Comments: Watery eyes bilaterally   Neck: Trachea normal and phonation normal. Neck supple. No Brudzinski's sign and no Kernig's sign noted. No neck rigidity present. No pain with movement present. No muscular tenderness present.   Cardiovascular: Normal rate, regular rhythm, S1 normal, S2 normal, normal heart sounds and normal pulses. Exam reveals no decreased pulses.   Pulmonary/Chest: Effort normal and breath sounds normal. No accessory muscle usage or stridor. No tachypnea. No respiratory distress. He has no decreased breath sounds. He has no wheezes. He has no rhonchi. He has no rales. He exhibits no tenderness.   Transmitted upper airway sounds which is cleared with coughing          Comments: Transmitted upper airway sounds which is cleared with coughing     Abdominal: Normal appearance  and bowel sounds are normal. He exhibits no distension. Soft.   Musculoskeletal: Normal range of motion.         General: Normal range of motion.      Cervical back: He exhibits no tenderness.      Right lower leg: No edema.      Left lower leg: No edema.   Lymphadenopathy:     He has cervical adenopathy (mild).   Neurological: no focal deficit. He is alert, oriented to person, place, and time and at baseline. He has normal sensation. He displays no dysarthria.   Skin: Skin is warm, dry, intact, not diaphoretic, not pale and no rash. Capillary refill takes less than 2 seconds. No erythema and No lesion   Psychiatric: He experiences Normal attention and Normal perception. His speech is normal and behavior is normal. Mood, memory, affect, judgment and thought content normal. Cognition normal  Nursing note and vitals reviewed.      Assessment:       1. Sinus problem    2. Sinus pressure    3. Watery eyes    4. Cough with congestion of paranasal sinus    5. Malaise and fatigue    6. Sinus headache        Results for orders placed or performed in visit on 03/24/23   SARS Coronavirus 2 Antigen, POCT Manual Read   Result Value Ref Range    SARS Coronavirus 2 Antigen Negative Negative     Acceptable Yes        Plan:         Sinus problem  -     SARS Coronavirus 2 Antigen, POCT Manual Read  -     Discontinue: dexAMETHasone injection 10 mg  -     dexAMETHasone injection 10 mg    Sinus pressure    Watery eyes    Cough with congestion of paranasal sinus    Malaise and fatigue    Sinus headache    Other orders  -     azelastine (ASTELIN) 137 mcg (0.1 %) nasal spray; 1 spray (137 mcg total) by Nasal route 2 (two) times daily.  Dispense: 30 mL; Refill: 0  -     benzonatate (TESSALON) 100 MG capsule; Take 2 capsules (200 mg total) by mouth 3 (three) times daily as needed for Cough.  Dispense: 21 capsule; Refill: 0  -     promethazine-dextromethorphan (PROMETHAZINE-DM) 6.25-15 mg/5 mL Syrp; Take 5 mLs by mouth every 6  (six) hours as needed (cough).  Dispense: 118 mL; Refill: 0           Medical Decision Making:   Initial Assessment:   Patient reports that he has been sick going on 3 weeks.  Reports that he has failed over-the-counter conservative treatment.  Request steroid shot.  Clinical Tests:   Lab Tests: Ordered and Reviewed  Urgent Care Management:  STEROID STEWARDSHIP:   Discussed risks versus benefits of steroid shot.    Explained that there is a risk of temporary decreased immune system and temporary elevation of blood pressure/blood sugar.    Patient elected to proceed with steroid shot at this time.    Patient monitored for 10-15 minutes after shot with no immediate complications.         Patient Instructions   VIRAL URI: OVER THE COUNTER RECOMMENDATIONS/SUGGESTIONS--if needed      SORE THROAT:    You may gargle with hot salt water 4 times a day for the next 2 days and then you may also gargle diluted hydrogen peroxide once to twice daily to alleviate some of your throat discomfort.  Drink plenty of fluids, recommend warm tea with honey.     YOU MAY USE OVER-THE-COUNTER CEPACOL FOR SOOTHING OF YOUR THROAT.  You may wish to avoid spicy food, citrus fruits, and red sauces- as this may irritate the throat more.    You can also take a daily anti-histamine such as Zyrtec, Claritin, Xyzal, OR Allegra-IN DAYTIME; NON DROWSY) AND/OR Benadryl- AT NIGHT; DROWSY) to help with runny nose/sneezing/sore throat/cough. Remember to switch antihistamines every 3 months, if taken daily.     COUGH:      Make sure you are getting rest and drinking lots of fluids.    You have been prescribed Tessalon perles and Promethazine syrup for cough. Promethazine causes drowsiness.  Do not drink alcohol or operate motor vehicles while taking.    You can use cough drops (recommend ricola lemon mint honey) or Cepacol to soothe your sore throat.     You can also take Elderberry and/or Emergen-C (vitamin C) to help boost your immune system.       You may  use any of the over-the-counter cough suppressant combination medications such as: NyQuil, DayQuil, Mucinex (guaifenesin), Robitussin, Delsym (Bromfed), TheraFlu  Note:   -pseudoephedrine (behind the counter) is a decongestant. Pseudoephedrine 30 mg up to 240 mg/day. *BE AWARE- It can raise your blood pressure and give you palpitations.  -Mucinex (guaifenisin) is to break up mucus up to 2400mg/day to loosen any mucous.   -Mucinex DM pill has a cough suppressant that can be sedating. It can be used at night to stop the tickle at the back of your throat.  -Mucinex D (it has guaifenesin and a high dose of pseudoephedrine) which could be helpful in the mornings to help decongest.  -Nyquil at night is beneficial to help you get some rest, however it is sedating and it does have an antihistamine and tylenol.  -- you may use over-the-counter Coricidin HBP in the event that you have a history of high blood pressure    Honey is a natural cough suppressant that can be used.    If your symptoms do not improve, you should return to this clinic. If your symptoms worsen, go to the emergency room.     *ASTELIN AS DIRECTED--to help with fluid behind ears/congestion/post nasal drip (one spray each nostril twice daily OR two sprays each nostril once daily.    How do you use a Nasal Spray?    Make sure you understand your dosing instructions. Spray only the number of prescribed sprays in each nostril. Read the package instructions before using your spray the first time.    Most sprays suggest the following steps:    Wash your hands well.    Gently blow your nose to clear the passageway.    Shake the container several times.    Tilt your head slightly downward.  Use the opposite hand from the nostril you will be spraying to hold the spray bottle.    Block one nostril with your finger.  Insert the nasal applicator into the other nostril.    Aim the spray toward the outer wall of the nostril.  Inhale slowly through the nose and press the  .    Breathe out and repeat to apply the prescribed number of sprays.  Repeat these steps for the other nostril.     Avoid sneezing or blowing your nose right after spraying.           CONGESTION:  Make sure to stay well hydrated.    Use Nasal Saline to mechanically move any post nasal drip from your eustachian tube or from the back of your throat.    You may insert a whole garlic cloves into your nostrils and leave for 10-15 minutes. When you remove them, mucus will be pulled down. This may burn as garlic is strong.  Repeat as often as needed and able to tolerate.  Please do not use garlic if you have an allergy to garlic.      PAIN/DISCOMFORT:  Tylenol up to 4,000 mg a day is safe for short periods and can be used for headache, body aches, pain, and fever. However in high doses and prolonged use it can cause liver irritation.    Ibuprofen is a non-steroidal anti-inflammatory that can be used for headache, body aches, pain, and fever. However it can also cause stomach irritation if over used.    If you have been discharged from the clinic prior to your point of care test results being completed, please make sure to check your [facility].  If there is a change in treatment, we will communicate with you through here.  If your test is positive, and medications are ordered, these will be sent to your preferred pharmacy.   If your test is negative, no further steps needed. If you do not hear from us or have questions, please call the clinic.      - You must understand that you have received an Urgent Care treatment only and that you may be released before all of your medical problems are known or treated.   - You, the patient, will arrange for follow up care as instructed with your primary care provider or recommended specialist.   - If your condition worsens or fails to improve we recommend that you receive another evaluation at the ER immediately or contact your PCP to discuss your concerns, or  return here.   - Please do not drive or make any important decisions for 24 hours if you have received any pain medications, sedatives or mood altering drugs during your visit.    Disclaimer: This document was drafted with the use of a voice recognition device and is likely to have sound alike errors.

## 2023-03-25 NOTE — PATIENT INSTRUCTIONS
VIRAL URI: OVER THE COUNTER RECOMMENDATIONS/SUGGESTIONS--if needed      SORE THROAT:    You may gargle with hot salt water 4 times a day for the next 2 days and then you may also gargle diluted hydrogen peroxide once to twice daily to alleviate some of your throat discomfort.  Drink plenty of fluids, recommend warm tea with honey.     YOU MAY USE OVER-THE-COUNTER CEPACOL FOR SOOTHING OF YOUR THROAT.  You may wish to avoid spicy food, citrus fruits, and red sauces- as this may irritate the throat more.    You can also take a daily anti-histamine such as Zyrtec, Claritin, Xyzal, OR Allegra-IN DAYTIME; NON DROWSY) AND/OR Benadryl- AT NIGHT; DROWSY) to help with runny nose/sneezing/sore throat/cough. Remember to switch antihistamines every 3 months, if taken daily.     COUGH:      Make sure you are getting rest and drinking lots of fluids.    You have been prescribed Tessalon perles and Promethazine syrup for cough. Promethazine causes drowsiness.  Do not drink alcohol or operate motor vehicles while taking.    You can use cough drops (recommend ricola lemon mint honey) or Cepacol to soothe your sore throat.     You can also take Elderberry and/or Emergen-C (vitamin C) to help boost your immune system.       You may use any of the over-the-counter cough suppressant combination medications such as: NyQuil, DayQuil, Mucinex (guaifenesin), Robitussin, Delsym (Bromfed), TheraFlu  Note:   -pseudoephedrine (behind the counter) is a decongestant. Pseudoephedrine 30 mg up to 240 mg/day. *BE AWARE- It can raise your blood pressure and give you palpitations.  -Mucinex (guaifenisin) is to break up mucus up to 2400mg/day to loosen any mucous.   -Mucinex DM pill has a cough suppressant that can be sedating. It can be used at night to stop the tickle at the back of your throat.  -Mucinex D (it has guaifenesin and a high dose of pseudoephedrine) which could be helpful in the mornings to help decongest.  -Nyquil at night is beneficial  to help you get some rest, however it is sedating and it does have an antihistamine and tylenol.  -- you may use over-the-counter Coricidin HBP in the event that you have a history of high blood pressure    Honey is a natural cough suppressant that can be used.    If your symptoms do not improve, you should return to this clinic. If your symptoms worsen, go to the emergency room.     *ASTELIN AS DIRECTED--to help with fluid behind ears/congestion/post nasal drip (one spray each nostril twice daily OR two sprays each nostril once daily.    How do you use a Nasal Spray?    Make sure you understand your dosing instructions. Spray only the number of prescribed sprays in each nostril. Read the package instructions before using your spray the first time.    Most sprays suggest the following steps:    Wash your hands well.    Gently blow your nose to clear the passageway.    Shake the container several times.    Tilt your head slightly downward.  Use the opposite hand from the nostril you will be spraying to hold the spray bottle.    Block one nostril with your finger.  Insert the nasal applicator into the other nostril.    Aim the spray toward the outer wall of the nostril.  Inhale slowly through the nose and press the .    Breathe out and repeat to apply the prescribed number of sprays.  Repeat these steps for the other nostril.     Avoid sneezing or blowing your nose right after spraying.           CONGESTION:  Make sure to stay well hydrated.    Use Nasal Saline to mechanically move any post nasal drip from your eustachian tube or from the back of your throat.    You may insert a whole garlic cloves into your nostrils and leave for 10-15 minutes. When you remove them, mucus will be pulled down. This may burn as garlic is strong.  Repeat as often as needed and able to tolerate.  Please do not use garlic if you have an allergy to garlic.      PAIN/DISCOMFORT:  Tylenol up to 4,000 mg a day is safe for  short periods and can be used for headache, body aches, pain, and fever. However in high doses and prolonged use it can cause liver irritation.    Ibuprofen is a non-steroidal anti-inflammatory that can be used for headache, body aches, pain, and fever. However it can also cause stomach irritation if over used.    If you have been discharged from the clinic prior to your point of care test results being completed, please make sure to check your MyChart account.  If there is a change in treatment, we will communicate with you through here.  If your test is positive, and medications are ordered, these will be sent to your preferred pharmacy.   If your test is negative, no further steps needed. If you do not hear from us or have questions, please call the clinic.      - You must understand that you have received an Urgent Care treatment only and that you may be released before all of your medical problems are known or treated.   - You, the patient, will arrange for follow up care as instructed with your primary care provider or recommended specialist.   - If your condition worsens or fails to improve we recommend that you receive another evaluation at the ER immediately or contact your PCP to discuss your concerns, or return here.   - Please do not drive or make any important decisions for 24 hours if you have received any pain medications, sedatives or mood altering drugs during your visit.    Disclaimer: This document was drafted with the use of a voice recognition device and is likely to have sound alike errors.

## 2023-05-03 ENCOUNTER — OFFICE VISIT (OUTPATIENT)
Dept: URGENT CARE | Facility: CLINIC | Age: 33
End: 2023-05-03
Payer: COMMERCIAL

## 2023-05-03 VITALS
DIASTOLIC BLOOD PRESSURE: 88 MMHG | HEART RATE: 93 BPM | RESPIRATION RATE: 16 BRPM | SYSTOLIC BLOOD PRESSURE: 137 MMHG | OXYGEN SATURATION: 97 % | WEIGHT: 260 LBS | BODY MASS INDEX: 40.81 KG/M2 | TEMPERATURE: 99 F | HEIGHT: 67 IN

## 2023-05-03 DIAGNOSIS — S81.859A: Primary | ICD-10-CM

## 2023-05-03 DIAGNOSIS — L08.9: Primary | ICD-10-CM

## 2023-05-03 DIAGNOSIS — L03.115 CELLULITIS OF RIGHT LOWER EXTREMITY: ICD-10-CM

## 2023-05-03 DIAGNOSIS — R50.9 ELEVATED TEMPERATURE: ICD-10-CM

## 2023-05-03 DIAGNOSIS — L29.9 ITCHING: ICD-10-CM

## 2023-05-03 PROCEDURE — 96372 PR INJECTION,THERAP/PROPH/DIAG2ST, IM OR SUBCUT: ICD-10-PCS | Mod: S$GLB,,, | Performed by: PHYSICIAN ASSISTANT

## 2023-05-03 PROCEDURE — 96372 THER/PROPH/DIAG INJ SC/IM: CPT | Mod: S$GLB,,, | Performed by: PHYSICIAN ASSISTANT

## 2023-05-03 PROCEDURE — 99214 OFFICE O/P EST MOD 30 MIN: CPT | Mod: 25,S$GLB,, | Performed by: PHYSICIAN ASSISTANT

## 2023-05-03 PROCEDURE — 99214 PR OFFICE/OUTPT VISIT, EST, LEVL IV, 30-39 MIN: ICD-10-PCS | Mod: 25,S$GLB,, | Performed by: PHYSICIAN ASSISTANT

## 2023-05-03 RX ORDER — DIPHENHYDRAMINE HCL 25 MG
50 CAPSULE ORAL ONCE
Status: COMPLETED | OUTPATIENT
Start: 2023-05-03 | End: 2023-05-03

## 2023-05-03 RX ORDER — DEXAMETHASONE SODIUM PHOSPHATE 100 MG/10ML
5 INJECTION INTRAMUSCULAR; INTRAVENOUS ONCE
Status: COMPLETED | OUTPATIENT
Start: 2023-05-03 | End: 2023-05-03

## 2023-05-03 RX ORDER — CEFTRIAXONE 1 G/1
1 INJECTION, POWDER, FOR SOLUTION INTRAMUSCULAR; INTRAVENOUS
Status: COMPLETED | OUTPATIENT
Start: 2023-05-03 | End: 2023-05-03

## 2023-05-03 RX ORDER — DOXYCYCLINE HYCLATE 100 MG
100 TABLET ORAL 2 TIMES DAILY
Qty: 14 TABLET | Refills: 0 | Status: SHIPPED | OUTPATIENT
Start: 2023-05-03 | End: 2023-05-10

## 2023-05-03 RX ADMIN — Medication 50 MG: at 04:05

## 2023-05-03 RX ADMIN — DEXAMETHASONE SODIUM PHOSPHATE 5 MG: 100 INJECTION INTRAMUSCULAR; INTRAVENOUS at 04:05

## 2023-05-03 RX ADMIN — CEFTRIAXONE 1 G: 1 INJECTION, POWDER, FOR SOLUTION INTRAMUSCULAR; INTRAVENOUS at 04:05

## 2023-05-03 NOTE — PROGRESS NOTES
"Subjective:      Patient ID: Pedro Costa is a 32 y.o. male.    Vitals:  height is 5' 7" (1.702 m) and weight is 117.9 kg (260 lb). His tympanic temperature is 99.2 °F (37.3 °C). His blood pressure is 137/88 and his pulse is 93. His respiration is 16 and oxygen saturation is 97%.     Chief Complaint: Insect Bite    Patient presents with a possible insect bite on the right medial knee that began with itching yesterday morning. The area is raised, red, and warm. He has been using an old prescription of antibiotic ointment and OTC tylenol.     Insect Bite  This is a new problem. The current episode started yesterday. The problem has been gradually worsening. Associated symptoms include a rash. Pertinent negatives include no abdominal pain, anorexia, arthralgias, change in bowel habit, chest pain, chills, congestion, coughing, diaphoresis, fatigue, fever, headaches, joint swelling, myalgias, nausea, neck pain, numbness, sore throat, swollen glands, urinary symptoms, vertigo, visual change, vomiting or weakness. He has tried acetaminophen for the symptoms.     Constitution: Negative for chills, sweating, fatigue and fever.   HENT:  Negative for congestion and sore throat.    Neck: Negative for neck pain.   Cardiovascular:  Negative for chest pain.   Respiratory:  Negative for cough.    Gastrointestinal:  Negative for abdominal pain, nausea and vomiting.   Musculoskeletal:  Negative for joint pain, joint swelling and muscle ache.   Skin:  Positive for rash and erythema.   Neurological:  Negative for history of vertigo, headaches and numbness.    Objective:     Vitals:    05/03/23 1605   BP: 137/88   BP Location: Left arm   Patient Position: Sitting   BP Method: X-Large (Automatic)   Pulse: 93   Resp: 16   Temp: 99.2 °F (37.3 °C)   TempSrc: Tympanic   SpO2: 97%   Weight: 117.9 kg (260 lb)   Height: 5' 7" (1.702 m)       Physical Exam   Constitutional: He is oriented to person, place, and time. He appears well-developed. "   HENT:   Head: Normocephalic and atraumatic. Head is without abrasion, without contusion and without laceration.   Ears:   Right Ear: External ear normal.   Left Ear: External ear normal.   Nose: Nose normal.   Mouth/Throat: Oropharynx is clear and moist and mucous membranes are normal.   Eyes: Conjunctivae, EOM and lids are normal. Pupils are equal, round, and reactive to light.   Neck: Trachea normal and phonation normal. Neck supple.   Cardiovascular: Normal rate, regular rhythm and normal heart sounds.   Pulmonary/Chest: Effort normal and breath sounds normal. No stridor. No respiratory distress.   Musculoskeletal: Normal range of motion.         General: Normal range of motion.   Neurological: He is alert and oriented to person, place, and time.   Skin: Skin is warm, dry, intact and no rash. Capillary refill takes less than 2 seconds. erythema No abrasion, No burn, No bruising and No ecchymosis   Psychiatric: His speech is normal and behavior is normal. Judgment and thought content normal.   Nursing note and vitals reviewed.      Assessment:     1. Infected bite of lower leg, initial encounter    2. Itching    3. Cellulitis of right lower extremity    4. Elevated temperature        Plan:       Infected bite of lower leg, initial encounter  -     dexAMETHasone injection 5 mg    Itching  -     diphenhydrAMINE capsule 50 mg    Cellulitis of right lower extremity  -     cefTRIAXone injection 1 g  -     doxycycline (VIBRA-TABS) 100 MG tablet; Take 1 tablet (100 mg total) by mouth 2 (two) times daily. for 7 days  Dispense: 14 tablet; Refill: 0    Elevated temperature          Medical Decision Making:   Urgent Care Management:  Pt monitored to 10-15 mins after clinic meds without immediate complications.   - states that he only lives 5-10 mins away and does not plan to drive after that today.        Patient Instructions   ALLERGIC REACTION:    - Take over-the-counter Claritin, Zyrtec, Allegra, OR Xyzal as directed  for itching/rash/allergic reaction starting tomorrow. You can take Benadryl over-the-counter as directed as well for itching/rash/allergic reaction. (Benadryl CAUSES DROWSINESS!-DO NOT DRIVE OR OPERATE HEAVY MACHINERY AFTER TAKING HIS MEDICATION)    - You should go to the ER for any new or worsening symtoms, including but not limited to: difficulty breathing, inability to swallow/talk, CP, or syncope.    --do not scratch  --follow-up with PCP this week or consider seeing dermatology if this continues    CELLULITIS:   - Rest.    - Drink plenty of fluids.    - Tylenol or Ibuprofen as directed as needed for fever/pain.      - You have been given an antibiotic to treat your condition today.    - Please complete the antibiotic as directed on the bottle.     - Keep the wound clean and dry.   - Wash daily with soap and water  - Warm water soaks (with or without epsom salt) 2-3 times a day for 5-10 minutes at a time     - YOU SHOULD EXPERIENCE SIGNIFICANT IMPROVEMENT IN SYMPTOMS IN THE NEXT 1-2 DAYS.  IF SYMPTOMS WORSEN, GO TO ER.  IF YOU DO NOT EXPERIENCE ANY IMPROVEMENT IN SYMPTOMS AND THE NEXT 1-2 DAYS WHILE YOU TAKE ANTIBIOTICS, RETURN TO CLINIC OR SEEK MEDICAL ATTENTION IMMEDIATELY.    - Follow up with your PCP or specialty clinic as directed in the next 1-2 weeks if not improved or as needed.  You can call [phone] to schedule an appointment with the appropriate provider.      - Go to the ER if you DEVELOP ANY NEW OR WORSENING SYMPTOMS.       - You must understand that you have received an Urgent Care treatment only and that you may be released before all of your medical problems are known or treated.   - You, the patient, will arrange for follow up care as instructed with your primary care provider or recommended specialist.   - If your condition worsens or fails to improve we recommend that you receive another evaluation at the ER immediately or contact your PCP to discuss your concerns, or return here.   -  Please do not drive or make any important decisions for 24 hours if you have received any pain medications, sedatives or mood altering drugs during your visit.    Disclaimer: This document was drafted with the use of a voice recognition device and is likely to have sound alike errors.

## 2023-05-03 NOTE — PATIENT INSTRUCTIONS
ALLERGIC REACTION:    - Take over-the-counter Claritin, Zyrtec, Allegra, OR Xyzal as directed for itching/rash/allergic reaction starting tomorrow. You can take Benadryl over-the-counter as directed as well for itching/rash/allergic reaction. (Benadryl CAUSES DROWSINESS!-DO NOT DRIVE OR OPERATE HEAVY MACHINERY AFTER TAKING HIS MEDICATION)    - You should go to the ER for any new or worsening symtoms, including but not limited to: difficulty breathing, inability to swallow/talk, CP, or syncope.    --do not scratch  --follow-up with PCP this week or consider seeing dermatology if this continues    CELLULITIS:   - Rest.    - Drink plenty of fluids.    - Tylenol or Ibuprofen as directed as needed for fever/pain.      - You have been given an antibiotic to treat your condition today.    - Please complete the antibiotic as directed on the bottle.     - Keep the wound clean and dry.   - Wash daily with soap and water  - Warm water soaks (with or without epsom salt) 2-3 times a day for 5-10 minutes at a time     - YOU SHOULD EXPERIENCE SIGNIFICANT IMPROVEMENT IN SYMPTOMS IN THE NEXT 1-2 DAYS.  IF SYMPTOMS WORSEN, GO TO ER.  IF YOU DO NOT EXPERIENCE ANY IMPROVEMENT IN SYMPTOMS AND THE NEXT 1-2 DAYS WHILE YOU TAKE ANTIBIOTICS, RETURN TO CLINIC OR SEEK MEDICAL ATTENTION IMMEDIATELY.    - Follow up with your PCP or specialty clinic as directed in the next 1-2 weeks if not improved or as needed.  You can call [phone] to schedule an appointment with the appropriate provider.      - Go to the ER if you DEVELOP ANY NEW OR WORSENING SYMPTOMS.       - You must understand that you have received an Urgent Care treatment only and that you may be released before all of your medical problems are known or treated.   - You, the patient, will arrange for follow up care as instructed with your primary care provider or recommended specialist.   - If your condition worsens or fails to improve we recommend that you receive another  evaluation at the ER immediately or contact your PCP to discuss your concerns, or return here.   - Please do not drive or make any important decisions for 24 hours if you have received any pain medications, sedatives or mood altering drugs during your visit.    Disclaimer: This document was drafted with the use of a voice recognition device and is likely to have sound alike errors.

## 2023-05-06 ENCOUNTER — TELEPHONE (OUTPATIENT)
Dept: URGENT CARE | Facility: CLINIC | Age: 33
End: 2023-05-06
Payer: COMMERCIAL

## 2023-08-20 ENCOUNTER — OFFICE VISIT (OUTPATIENT)
Dept: URGENT CARE | Facility: CLINIC | Age: 33
End: 2023-08-20
Payer: COMMERCIAL

## 2023-08-20 VITALS
RESPIRATION RATE: 18 BRPM | BODY MASS INDEX: 47.8 KG/M2 | TEMPERATURE: 99 F | HEART RATE: 93 BPM | OXYGEN SATURATION: 95 % | HEIGHT: 64 IN | SYSTOLIC BLOOD PRESSURE: 144 MMHG | WEIGHT: 280 LBS | DIASTOLIC BLOOD PRESSURE: 86 MMHG

## 2023-08-20 DIAGNOSIS — H90.12 CONDUCTIVE HEARING LOSS OF LEFT EAR, UNSPECIFIED HEARING STATUS ON CONTRALATERAL SIDE: ICD-10-CM

## 2023-08-20 DIAGNOSIS — S29.012A MUSCLE STRAIN OF RIGHT UPPER BACK, INITIAL ENCOUNTER: Primary | ICD-10-CM

## 2023-08-20 DIAGNOSIS — R07.89 COSTOCHONDRAL PAIN: ICD-10-CM

## 2023-08-20 DIAGNOSIS — H66.015 RECURRENT ACUTE SUPPURATIVE OTITIS MEDIA WITH SPONTANEOUS RUPTURE OF LEFT TYMPANIC MEMBRANE: ICD-10-CM

## 2023-08-20 PROCEDURE — 99204 PR OFFICE/OUTPT VISIT, NEW, LEVL IV, 45-59 MIN: ICD-10-PCS | Mod: S$GLB,,, | Performed by: NURSE PRACTITIONER

## 2023-08-20 PROCEDURE — 99204 OFFICE O/P NEW MOD 45 MIN: CPT | Mod: S$GLB,,, | Performed by: NURSE PRACTITIONER

## 2023-08-20 RX ORDER — METHOCARBAMOL 500 MG/1
500 TABLET, FILM COATED ORAL 4 TIMES DAILY
Qty: 28 TABLET | Refills: 0 | Status: SHIPPED | OUTPATIENT
Start: 2023-08-20 | End: 2023-08-27

## 2023-08-20 RX ORDER — AMOXICILLIN 500 MG/1
500 CAPSULE ORAL EVERY 12 HOURS
Qty: 20 CAPSULE | Refills: 0 | Status: SHIPPED | OUTPATIENT
Start: 2023-08-20 | End: 2023-08-30

## 2023-08-20 RX ORDER — NAPROXEN 500 MG/1
500 TABLET ORAL 2 TIMES DAILY WITH MEALS
Qty: 14 TABLET | Refills: 0 | Status: SHIPPED | OUTPATIENT
Start: 2023-08-20 | End: 2023-08-27

## 2023-08-20 NOTE — PATIENT INSTRUCTIONS
A referral has be placed for you to follow up with ENT. Someone should be contacting you soon to set up appointment. However, you may call [phone] at anytime to schedule this follow up appointment       Please do not stick anything in your ears including Q-tips or brenda pins    Q-tips must be avoided, instead the ears can be cleaned with OTC ear rinses (or mineral oil).  If the cerumen impacts the ear canal and causes hearing loss or infection she needs to follow-up in the clinic for treatment and cleaning        We discussed preventative measures and treatment options.  Q-tips must be avoided, instead the ears can be cleaned with OTC ear rinses (or mineral oil).  If the cerumen impacts the ear canal and causes hearing loss or infection she needs to follow-up in the clinic for treatment and cleaning next week in email    If you have been discharged from the clinic prior to your point of care test results being completed, please make sure to check your [employer] account.  If there is a change in treatment, we will communicate with you through here.  If your test is positive, and medications are ordered, these will be sent to your preferred pharmacy.   If your test is negative, no further steps needed. If you do not hear from us or have questions, please call the clinic.        - You must understand that you have received an Urgent Care treatment only and that you may be released before all of your medical problems are known or treated.   - You, the patient, will arrange for follow up care as instructed with your primary care provider or recommended specialist.   - If your condition worsens or fails to improve we recommend that you receive another evaluation at the ER immediately or contact your PCP to discuss your concerns, or return here.   - Please do not drive or make any important decisions for 24 hours if you have received any pain medications, sedatives or mood altering drugs during your visit.      Disclaimer: This document was drafted with the use of a voice recognition device and is likely to have sound alike errors.

## 2023-08-20 NOTE — PROGRESS NOTES
"Subjective:      Patient ID: Pedro Melgar Jr. is a 33 y.o. male.    Vitals:  height is 5' 4" (1.626 m) and weight is 127 kg (280 lb). His oral temperature is 98.8 °F (37.1 °C). His blood pressure is 144/86 (abnormal) and his pulse is 93. His respiration is 18 and oxygen saturation is 95%.     Chief Complaint: Back Pain (Upper back pain x 2 days with stiffness this Am. Pt denies nay injury )    33 year old female patient presented here today with upper back pain x 2 days with stiffness this Am. Pt denies nay injury. Pt denies sob, cp, fever. Pt states pain when breathing in/out & chest tightness. Pt states he have tried OTC tylenol fo pain sx.       States 2 years ago he started with left ear drainage  States he also has hearing loss in left ear  Wife states he digs in his ear with qtips      Back Pain  This is a new problem. The current episode started yesterday. The problem occurs constantly. The problem is unchanged. The pain is present in the thoracic spine. The quality of the pain is described as aching. The pain does not radiate. The pain is at a severity of 6/10. The pain is moderate. The pain is The same all the time. The symptoms are aggravated by bending, lying down, coughing, position, standing, sitting and twisting. Stiffness is present In the morning. Pertinent negatives include no abdominal pain, bladder incontinence, bowel incontinence, chest pain, dysuria, fever, headaches, leg pain, numbness, paresis, paresthesias, pelvic pain, perianal numbness, tingling, weakness or weight loss. Risk factors include obesity. He has tried bed rest for the symptoms. The treatment provided no relief.       Constitution: Negative for fever.   HENT:  Positive for ear pain, ear discharge and hearing loss.    Cardiovascular:  Negative for chest pain and sob on exertion.   Respiratory:  Negative for chest tightness and shortness of breath.    Gastrointestinal:  Negative for abdominal pain and bowel incontinence. "   Genitourinary:  Negative for dysuria, bladder incontinence and pelvic pain.   Musculoskeletal:  Positive for back pain.   Neurological:  Negative for headaches and numbness.      Objective:     Physical Exam   Constitutional: He appears well-developed. He is cooperative.  Non-toxic appearance. He does not appear ill. No distress. well-groomed and obesityawake  HENT:   Head: Normocephalic and atraumatic.   Ears:   Right Ear: There is drainage. No swelling or tenderness. Tympanic membrane is perforated. Tympanic membrane is not injected.   Left Ear: Tympanic membrane, external ear and ear canal normal. No no drainage, swelling or tenderness. Tympanic membrane is not injected and not perforated.   Nose: Nose normal.   Eyes: Conjunctivae and EOM are normal.   Neck: Neck supple.   Cardiovascular: Normal rate and regular rhythm.   Pulmonary/Chest: Effort normal and breath sounds normal. No stridor. No respiratory distress. He has no wheezes. He has no rhonchi. He has no rales.   Abdominal: Normal appearance.   Musculoskeletal: Normal range of motion.         General: Normal range of motion.      Thoracic back: He exhibits tenderness. He exhibits normal range of motion, no bony tenderness and no spasm.      Lumbar back: Normal.        Arms:       Comments: Tenderness to touch upon palpation to area noted  FROM to cervical spine area     Neurological: no focal deficit. He is alert. He displays no weakness. Gait normal.   Skin: Skin is warm, dry, not diaphoretic, not pale and no rash.   Psychiatric: His behavior is normal.   Nursing note and vitals reviewed.      Assessment:     1. Muscle strain of right upper back, initial encounter    2. Costochondral pain    3. Recurrent acute suppurative otitis media with spontaneous rupture of left tympanic membrane    4. Conductive hearing loss of left ear, unspecified hearing status on contralateral side        Plan:       Muscle strain of right upper back, initial encounter  -      naproxen (NAPROSYN) 500 MG tablet; Take 1 tablet (500 mg total) by mouth 2 (two) times daily with meals. for 7 days  Dispense: 14 tablet; Refill: 0  -     methocarbamoL (ROBAXIN) 500 MG Tab; Take 1 tablet (500 mg total) by mouth 4 (four) times daily. for 7 days  Dispense: 28 tablet; Refill: 0    Costochondral pain  -     naproxen (NAPROSYN) 500 MG tablet; Take 1 tablet (500 mg total) by mouth 2 (two) times daily with meals. for 7 days  Dispense: 14 tablet; Refill: 0  -     methocarbamoL (ROBAXIN) 500 MG Tab; Take 1 tablet (500 mg total) by mouth 4 (four) times daily. for 7 days  Dispense: 28 tablet; Refill: 0    Recurrent acute suppurative otitis media with spontaneous rupture of left tympanic membrane  -     amoxicillin (AMOXIL) 500 MG capsule; Take 1 capsule (500 mg total) by mouth every 12 (twelve) hours. for 10 days  Dispense: 20 capsule; Refill: 0  -     Ambulatory referral/consult to ENT    Conductive hearing loss of left ear, unspecified hearing status on contralateral side  -     amoxicillin (AMOXIL) 500 MG capsule; Take 1 capsule (500 mg total) by mouth every 12 (twelve) hours. for 10 days  Dispense: 20 capsule; Refill: 0  -     Ambulatory referral/consult to ENT        Referral to ENT for loss of hearing and chronic left ear drainage  Avoid inserting any objects in ears   If symptoms persists or worsen, RTC, follow up with PCP, or go to the nearest ER  Patient agreed with plan of care and verbalized understanding       Patient Instructions   A referral has be placed for you to follow up with ENT. Someone should be contacting you soon to set up appointment. However, you may call [phone] at anytime to schedule this follow up appointment       Please do not stick anything in your ears including Q-tips or brenda pins    Q-tips must be avoided, instead the ears can be cleaned with OTC ear rinses (or mineral oil).  If the cerumen impacts the ear canal and causes hearing loss or infection she needs to  follow-up in the clinic for treatment and cleaning        We discussed preventative measures and treatment options.  Q-tips must be avoided, instead the ears can be cleaned with OTC ear rinses (or mineral oil).  If the cerumen impacts the ear canal and causes hearing loss or infection she needs to follow-up in the clinic for treatment and cleaning next week in email    If you have been discharged from the clinic prior to your point of care test results being completed, please make sure to check your [employer] account.  If there is a change in treatment, we will communicate with you through here.  If your test is positive, and medications are ordered, these will be sent to your preferred pharmacy.   If your test is negative, no further steps needed. If you do not hear from us or have questions, please call the clinic.        - You must understand that you have received an Urgent Care treatment only and that you may be released before all of your medical problems are known or treated.   - You, the patient, will arrange for follow up care as instructed with your primary care provider or recommended specialist.   - If your condition worsens or fails to improve we recommend that you receive another evaluation at the ER immediately or contact your PCP to discuss your concerns, or return here.   - Please do not drive or make any important decisions for 24 hours if you have received any pain medications, sedatives or mood altering drugs during your visit.     Disclaimer: This document was drafted with the use of a voice recognition device and is likely to have sound alike errors.

## 2023-08-29 ENCOUNTER — OFFICE VISIT (OUTPATIENT)
Dept: OTOLARYNGOLOGY | Facility: CLINIC | Age: 33
End: 2023-08-29
Payer: COMMERCIAL

## 2023-08-29 ENCOUNTER — CLINICAL SUPPORT (OUTPATIENT)
Dept: AUDIOLOGY | Facility: CLINIC | Age: 33
End: 2023-08-29
Payer: COMMERCIAL

## 2023-08-29 DIAGNOSIS — H90.12 CONDUCTIVE HEARING LOSS OF LEFT EAR WITH UNRESTRICTED HEARING OF RIGHT EAR: Primary | ICD-10-CM

## 2023-08-29 DIAGNOSIS — Z77.122 HISTORY OF EXPOSURE TO NOISE: ICD-10-CM

## 2023-08-29 DIAGNOSIS — H60.332 ACUTE SWIMMER'S EAR OF LEFT SIDE: Primary | ICD-10-CM

## 2023-08-29 DIAGNOSIS — H90.12 CONDUCTIVE HEARING LOSS OF LEFT EAR WITH UNRESTRICTED HEARING OF RIGHT EAR: ICD-10-CM

## 2023-08-29 DIAGNOSIS — H69.92 ETD (EUSTACHIAN TUBE DYSFUNCTION), LEFT: ICD-10-CM

## 2023-08-29 DIAGNOSIS — H69.93 DYSFUNCTION OF BOTH EUSTACHIAN TUBES: ICD-10-CM

## 2023-08-29 PROCEDURE — 99999 PR PBB SHADOW E&M-EST. PATIENT-LVL III: ICD-10-PCS | Mod: PBBFAC,,, | Performed by: OTOLARYNGOLOGY

## 2023-08-29 PROCEDURE — 99999 PR PBB SHADOW E&M-EST. PATIENT-LVL I: CPT | Mod: PBBFAC,,,

## 2023-08-29 PROCEDURE — 99999 PR PBB SHADOW E&M-EST. PATIENT-LVL I: ICD-10-PCS | Mod: PBBFAC,,,

## 2023-08-29 PROCEDURE — 1160F RVW MEDS BY RX/DR IN RCRD: CPT | Mod: CPTII,S$GLB,, | Performed by: OTOLARYNGOLOGY

## 2023-08-29 PROCEDURE — 92567 TYMPANOMETRY: CPT | Mod: S$GLB,,,

## 2023-08-29 PROCEDURE — 92557 COMPREHENSIVE HEARING TEST: CPT | Mod: S$GLB,,,

## 2023-08-29 PROCEDURE — 92557 PR COMPREHENSIVE HEARING TEST: ICD-10-PCS | Mod: S$GLB,,,

## 2023-08-29 PROCEDURE — 1159F MED LIST DOCD IN RCRD: CPT | Mod: CPTII,S$GLB,, | Performed by: OTOLARYNGOLOGY

## 2023-08-29 PROCEDURE — 99999 PR PBB SHADOW E&M-EST. PATIENT-LVL III: CPT | Mod: PBBFAC,,, | Performed by: OTOLARYNGOLOGY

## 2023-08-29 PROCEDURE — 99204 PR OFFICE/OUTPT VISIT, NEW, LEVL IV, 45-59 MIN: ICD-10-PCS | Mod: S$GLB,,, | Performed by: OTOLARYNGOLOGY

## 2023-08-29 PROCEDURE — 1160F PR REVIEW ALL MEDS BY PRESCRIBER/CLIN PHARMACIST DOCUMENTED: ICD-10-PCS | Mod: CPTII,S$GLB,, | Performed by: OTOLARYNGOLOGY

## 2023-08-29 PROCEDURE — 99204 OFFICE O/P NEW MOD 45 MIN: CPT | Mod: S$GLB,,, | Performed by: OTOLARYNGOLOGY

## 2023-08-29 PROCEDURE — 1159F PR MEDICATION LIST DOCUMENTED IN MEDICAL RECORD: ICD-10-PCS | Mod: CPTII,S$GLB,, | Performed by: OTOLARYNGOLOGY

## 2023-08-29 PROCEDURE — 92567 PR TYMPA2METRY: ICD-10-PCS | Mod: S$GLB,,,

## 2023-08-29 RX ORDER — CIPROFLOXACIN AND DEXAMETHASONE 3; 1 MG/ML; MG/ML
4 SUSPENSION/ DROPS AURICULAR (OTIC) 2 TIMES DAILY
Qty: 7.5 ML | Refills: 0 | Status: SHIPPED | OUTPATIENT
Start: 2023-08-29 | End: 2023-09-08

## 2023-08-29 RX ORDER — FLUTICASONE PROPIONATE 50 MCG
2 SPRAY, SUSPENSION (ML) NASAL DAILY
Qty: 16 G | Refills: 5 | Status: SHIPPED | OUTPATIENT
Start: 2023-08-29

## 2023-08-29 NOTE — PROGRESS NOTES
"Referring Provider:    Eloise Squires Np  [address]  [address]  [place] [address]  Subjective:   Patient: Pedro Melgar Jr. [id number], :1990   Visit date:2023 11:05 AM    Chief Complaint:  Otitis Media (Pt states that he may have an ruptured ear drum in his left ear and have been going on for the last 2 yrs with drainage and ever so often he gets an ear infection)    HPI:    Prior notes reviewed by myself.  Clinical documentation obtained by nursing staff reviewed.     33-year-old gentleman presents for evaluation of left-sided otalgia, muffled hearing and possible ear infection.  He states that these symptoms fluctuate but are always on the left side.  Was recently diagnosed with swimmer's ear and a possible middle ear infection.  He is currently on amoxicillin prescribed by another physician.  He had an audiogram today which is noted below.  He denies any significant tinnitus or vertigo.  No prior history of otologic surgery but he does have a history of loud noise exposure, occupational.      Objective:     Physical Exam:  Vitals:  There were no vitals taken for this visit.  General appearance:  Well developed, well nourished    Ears:  Otoscopy of external auditory canals and tympanic membranes was normal right and edema/erythema left EAC with mucoid drainage and intact TM with no middle ear effusion, mild retraction, clinical speech reception thresholds grossly intact, no mass/lesion of auricle.    Nose:  No masses/lesions of external nose, nasal mucosa, septum, and turbinates were within normal limits.    Mouth:  No mass/lesion of lips, teeth, gums, hard/soft palate, tongue, tonsils, or oropharynx.    Neck & Lymphatics:  No cervical lymphadenopathy, no neck mass/crepitus/ asymmetry, trachea is midline, no thyroid enlargement/tenderness/mass.        [x]  Data Reviewed:    No results found for: "WBC", "HGB", "HCT", "MCV", "LABPLAT", "EOSINOPHIL"      [x]  Independent " interpretation of test: CHL As with type C tymp     Clinical Support    8/29/2023  [facility] - Audiology  Radha Erwin Au.D, CCC-A  Audiology Conductive hearing loss of left ear with unrestricted hearing of right ear +2 more  Dx Referred by Eloise Squires NP  Reason for Visit     Instructions    After Visit Summary (Automatic SnapShot taken 8/29/2023)  Progress Notes  Rdaha Erwin Au.D, CCC-A (Audiologist)   Audiology  Referring Provider: Eloise Squires NP      Pedro Melgar Jr. was seen 08/29/2023 for an audiological evaluation. Patient complains of intermittent left ear otorrhea and muffled hearing for the past 1-2 years. He has a history of occupational noise exposure with use of hearing protection. Patient denied tinnitus and family history of hearing loss.     Otoscopy revealed  clear canal with visualization of the tympanic membrane in the right ear and occluding debris without visualization of the tympanic membrane in the left ear. Tympanograms were Type Ad for the right ear and Type C for the left ear. Audiometry revealed normal hearing sensitivity for the right ear, and normal hearing sensitivity to a slight to mild conductive hearing loss for the left ear. Speech Reception Thresholds were  15 dBHL for the right ear and 20 dBHL for the left ear. Word recognition scores were excellent for the right ear and excellent for the left ear.     Patient was counseled on the above findings.     Recommendations:  Follow-up with ENT, as scheduled.  Repeat audiological evaluation per ENT, or sooner if needed.                Additional Documentation    Encounter Info:  Billing Info,  Detailed Report,  Education,  Care Plan,  History,  Allergies,  Patient-Entered Questionnaires,  Outpatient Care Plans  ...(6 more)     Media  From this encounter  Annotation on 8/29/2023 10:57 AM by Radha Erwin Au.D, CCC-A: AUDIOGRAM     Not recorded  All Charges for This Encounter    Code Description Service  Date Service Provider Modifiers Qty   63528 OK COMPREHENSIVE HEARING TEST 8/29/2023 Radha Erwin Au.D, CCC-A  1   37729 OK [id number] 8/29/2023 Radha Erwin Au.D, CCC-A  1   [id number] OK PBB SHADOW E&M-EST. PATIENT-LVL I 8/29/2023 Radha Erwin Au.D, CCC-A PBBFAC 1     Level of Service    BestPractice Advisories    Click to view BestPractice Advisory history     AVS Reports    Date/Time Report Action User   8/29/2023 11:04 AM After Visit Summary Automatically Generated Radha Erwin Au.D, CCC-A     Encounter-Level Documents on 08/29/2023:    After Visit Summary - Document on 8/29/2023 11:04 AM by Radha Erwin Au.D, CCC-A: After Visit Summary  Orders Placed    None  Medication Changes      None  Medication List  Visit Diagnoses      Conductive hearing loss of left ear with unrestricted hearing of right ear    ETD (Eustachian tube dysfunction), left    History of exposure to noise  Problem List          Assessment & Plan:   Acute swimmer's ear of left side  -     ciprofloxacin-dexAMETHasone 0.3-0.1% (CIPRODEX) 0.3-0.1 % DrpS; Place 4 drops into the left ear 2 (two) times daily. for 10 days  Dispense: 7.5 mL; Refill: 0    Dysfunction of both eustachian tubes  -     fluticasone propionate (FLONASE) 50 mcg/actuation nasal spray; 2 sprays (100 mcg total) by Each Nostril route once daily.  Dispense: 16 g; Refill: 5    Conductive hearing loss of left ear with unrestricted hearing of right ear        He has evidence of acute otitis externa on the left side.  We will treat that with a round of Ciprodex.  He also has a conductive hearing loss and retraction as well as a type C tympanogram on the same side.  I encouraged him to practice autoinsufflation and uses Flonase regularly.  Will have him return in 2 weeks for repeat audiogram and re-evaluation.    Conservative Treatment of Eustachian Tube Dysfunction    Your physician has diagnosed you with eustachian tube dysfunction.  There are several  conservative medical treatments that have been shown to help improve the function of your eustachian tube.    Topical Decongestant Spray (Afrin):  2 sprays in each nostril twice daily for 3 days.  It is important to stop this medication after 3 days as it can be habit-forming.  Buy the Afrin pump spray mist.  This usually comes in a red and white box over the counter.  Autoinsufflation (popping the ears):  Recommend gentle popping of the ears 3-4 times per day and as needed for symptom relief.  Oral Steroids:  Your physician may or may not recommend a short course of oral steroids.  If so, take as directed.  These help decrease the inflammation in your nasal/sinus cavity as well as around your eustachian tube which may help with your symptoms  Nasal Steroids:  Most commonly fluticasone 2 sprays in each nostril once daily.  This will help decrease the inflammation in your nasal/sinus cavity as well as around your eustachian tube which may help with your symptoms    If no improvement after 2 weeks of conservative treatment, your physician may recommend a myringotomy and or ear tube placement for symptom relief.      Dr. Alexander's Eustachian Tube Dysfunction Protocol          Day 1-3 (Perform 2x per day)     Afrin (pump spray mist OTC) 2 sprays in each nostril   Fluticasone nasal spray 2 sprays in each nostril      Days 4 - follow up visit (perform 1x per day)    Fluticasone nasal spray 2 sprays in each nostril      *Autoinsufflate (POP your ears gently!) at least 4 times per day until your follow up appointment    *TAKE ALL OTHER MEDICATIONS AS DIRECTED BY DR ALEXANDER    *MAKE SURE YOU STOP USING AFRIN AFTER THE 3RD DAY AS THERE IS A RISK OF BECOMING DEPENDENT ON THAT MEDICATION

## 2023-11-06 ENCOUNTER — OFFICE VISIT (OUTPATIENT)
Dept: URGENT CARE | Facility: CLINIC | Age: 33
End: 2023-11-06
Payer: COMMERCIAL

## 2023-11-06 VITALS
HEIGHT: 67 IN | OXYGEN SATURATION: 97 % | BODY MASS INDEX: 40.81 KG/M2 | DIASTOLIC BLOOD PRESSURE: 60 MMHG | HEART RATE: 71 BPM | TEMPERATURE: 98 F | WEIGHT: 260 LBS | SYSTOLIC BLOOD PRESSURE: 118 MMHG | RESPIRATION RATE: 18 BRPM

## 2023-11-06 DIAGNOSIS — H21.01 HYPHEMA OF RIGHT EYE: ICD-10-CM

## 2023-11-06 DIAGNOSIS — H10.9 BACTERIAL CONJUNCTIVITIS OF RIGHT EYE: Primary | ICD-10-CM

## 2023-11-06 PROCEDURE — 99214 OFFICE O/P EST MOD 30 MIN: CPT | Mod: S$GLB,,, | Performed by: PHYSICIAN ASSISTANT

## 2023-11-06 PROCEDURE — 99214 PR OFFICE/OUTPT VISIT, EST, LEVL IV, 30-39 MIN: ICD-10-PCS | Mod: S$GLB,,, | Performed by: PHYSICIAN ASSISTANT

## 2023-11-06 RX ORDER — ERYTHROMYCIN 5 MG/G
OINTMENT OPHTHALMIC 3 TIMES DAILY
Qty: 3.5 G | Refills: 0 | Status: SHIPPED | OUTPATIENT
Start: 2023-11-06 | End: 2023-11-13

## 2023-11-06 RX ORDER — KETOROLAC TROMETHAMINE 10 MG/1
TABLET, FILM COATED ORAL
COMMUNITY

## 2023-11-06 NOTE — PATIENT INSTRUCTIONS
CONJUNCTIVITIS/PINK EYE:      Use the eye ointment as directed.     You should avoid touching your eyes and wash your hands to avoid spreading the infection.    Clean cool compresses to the eye may be comforting    Wear your glasses and avoid wearing contacts until the infection is better (at least 1-2 weeks from now).    Change/ wash bedding.     Wear sunglasses on evy days.     If your symptoms do not improve, you will need to follow-up with ophthalmology as we discussed.     If you have been discharged from the clinic prior to your point of care test results being completed, please make sure to check your [facility] account.  If there is a change in treatment, we will communicate with you through here.  If your test is positive, and medications are ordered, these will be sent to your preferred pharmacy.   If your test is negative, no further steps needed. If you do not hear from us or have questions, please call the clinic.      - You must understand that you have received an Urgent Care treatment only and that you may be released before all of your medical problems are known or treated.   - You, the patient, will arrange for follow up care as instructed with your primary care provider or recommended specialist.   - If your condition worsens or fails to improve we recommend that you receive another evaluation at the ER immediately or contact your PCP to discuss your concerns, or return here.   - Please do not drive or make any important decisions for 24 hours if you have received any pain medications, sedatives or mood altering drugs during your visit.    Disclaimer: This document was drafted with the use of a voice recognition device and is likely to have sound alike errors.

## 2023-11-06 NOTE — LETTER
November 6, 2023      [facility]  [address], [address]  [place] [address]  Phone: [phone]       Patient: Pedro Costa   YOB: 1990  Date of Visit: 11/06/2023    To Whom It May Concern:    Alf Costa  was at [facility] on 11/06/2023. The patient may return to work/school on 11/8 with no restrictions. If you have any questions or concerns, or if I can be of further assistance, please do not hesitate to contact me.    Sincerely,    Elen Hector PA-C

## 2023-11-06 NOTE — PROGRESS NOTES
"Subjective:      Patient ID: Pedro Costa is a 33 y.o. male.    Vitals:  height is 5' 7" (1.702 m) and weight is 117.9 kg (260 lb). His oral temperature is 98.4 °F (36.9 °C). His blood pressure is 118/60 and his pulse is 71. His respiration is 18 and oxygen saturation is 97%.     Chief Complaint: Eye Problem    33 year old male pt with symptoms of Rt eye redness, draining and itchiness x 3 days. Pt states sometimes in the morning Rt eye is swollen. Pt denies known sick contacts, CP, SOB and fever. Pt states he has used prescription eye drops that his girlfriend had left over when she had pink eye before and states he also took Benadryl for symptoms.       Eye Problem   The right eye is affected. This is a new problem. The current episode started in the past 7 days. The problem occurs constantly. The problem has been unchanged. There was no injury mechanism. The pain is at a severity of 0/10. The patient is experiencing no pain. There is No known exposure to pink eye. He Does not wear contacts. Associated symptoms include an eye discharge, eye redness and itching. Pertinent negatives include no blurred vision, double vision, fever, foreign body sensation, nausea, photophobia, recent URI or vomiting. He has tried eye drops for the symptoms. The treatment provided no relief.       Constitution: Negative for fever.   Eyes:  Positive for eye discharge, eye itching and eye redness. Negative for photophobia, double vision and blurred vision.   Gastrointestinal:  Negative for nausea and vomiting.      Objective:     Vitals:    11/06/23 0905   BP: 118/60   Pulse: 71   Resp: 18   Temp: 98.4 °F (36.9 °C)   TempSrc: Oral   SpO2: 97%   Weight: 117.9 kg (260 lb)   Height: 5' 7" (1.702 m)       Physical Exam   Constitutional: He is oriented to person, place, and time. He appears well-developed.   HENT:   Head: Normocephalic and atraumatic.   Ears:   Right Ear: External ear normal.   Left Ear: External ear normal.   Nose: Nose " normal.   Mouth/Throat: Oropharynx is clear and moist.   Eyes: Right eye visual fields normal and left eye visual fields normal. EOM and lids are normal. Pupils are equal, round, and reactive to light. Right eye exhibits exudate. Right eye exhibits no chemosis. Right conjunctiva is injected. Right conjunctiva has a hemorrhage. Extraocular movement intact vision grossly intact gaze aligned appropriately   Neck: Trachea normal and phonation normal. Neck supple.   Musculoskeletal: Normal range of motion.         General: Normal range of motion.   Neurological: He is alert and oriented to person, place, and time.   Skin: Skin is warm, dry and intact.   Psychiatric: His speech is normal and behavior is normal. Judgment and thought content normal.   Nursing note and vitals reviewed.        Assessment:     1. Bacterial conjunctivitis of right eye    2. Hyphema of right eye        Plan:       Bacterial conjunctivitis of right eye  -     erythromycin (ROMYCIN) ophthalmic ointment; Place into both eyes 3 (three) times daily. PLEASE APPLY TO THE AFFECTED EYE(S).  PLEASE REFER TO OINTMENT APPLICATION INSTRUCTIONS FOUND IN YOUR AFTER VISIT SUMMARY. for 7 days  Dispense: 3.5 g; Refill: 0    Hyphema of right eye      Patient Instructions   CONJUNCTIVITIS/PINK EYE:      Use the eye ointment as directed.     You should avoid touching your eyes and wash your hands to avoid spreading the infection.    Clean cool compresses to the eye may be comforting    Wear your glasses and avoid wearing contacts until the infection is better (at least 1-2 weeks from now).    Change/ wash bedding.     Wear sunglasses on evy days.     If your symptoms do not improve, you will need to follow-up with ophthalmology as we discussed.     If you have been discharged from the clinic prior to your point of care test results being completed, please make sure to check your [facility] account.  If there is a change in treatment, we will communicate with you  through here.  If your test is positive, and medications are ordered, these will be sent to your preferred pharmacy.   If your test is negative, no further steps needed. If you do not hear from us or have questions, please call the clinic.      - You must understand that you have received an Urgent Care treatment only and that you may be released before all of your medical problems are known or treated.   - You, the patient, will arrange for follow up care as instructed with your primary care provider or recommended specialist.   - If your condition worsens or fails to improve we recommend that you receive another evaluation at the ER immediately or contact your PCP to discuss your concerns, or return here.   - Please do not drive or make any important decisions for 24 hours if you have received any pain medications, sedatives or mood altering drugs during your visit.    Disclaimer: This document was drafted with the use of a voice recognition device and is likely to have sound alike errors.         Medical Decision Making:   History:   I obtained history from: someone other than patient.  Initial Assessment:   Blood shot pink eye  Vision intact  Does not wear glasses or contacts  No eye trauma  Urgent Care Management:    I have discussed the diagnosis, treatment plan and recommendations for follow-up with primary care, and patient verbalized understanding and is agreeable to the plan.   AVS printed and given to patient upon discharge with information regarding this visit. All questions were addressed prior to discharge.

## 2024-04-02 ENCOUNTER — OFFICE VISIT (OUTPATIENT)
Dept: URGENT CARE | Facility: CLINIC | Age: 34
End: 2024-04-02
Payer: COMMERCIAL

## 2024-04-02 VITALS
WEIGHT: 270 LBS | SYSTOLIC BLOOD PRESSURE: 132 MMHG | RESPIRATION RATE: 18 BRPM | HEIGHT: 67 IN | TEMPERATURE: 98 F | DIASTOLIC BLOOD PRESSURE: 76 MMHG | BODY MASS INDEX: 42.38 KG/M2 | OXYGEN SATURATION: 98 % | HEART RATE: 88 BPM

## 2024-04-02 DIAGNOSIS — M25.562 ACUTE PAIN OF LEFT KNEE: ICD-10-CM

## 2024-04-02 DIAGNOSIS — M1A.0710 CHRONIC IDIOPATHIC GOUT INVOLVING TOE OF RIGHT FOOT WITHOUT TOPHUS: Chronic | ICD-10-CM

## 2024-04-02 DIAGNOSIS — M10.062 ACUTE IDIOPATHIC GOUT OF LEFT KNEE: Primary | ICD-10-CM

## 2024-04-02 PROBLEM — E11.69 TYPE 2 DIABETES MELLITUS WITH HYPERLIPIDEMIA: Chronic | Status: ACTIVE | Noted: 2023-05-21

## 2024-04-02 PROBLEM — D69.6 THROMBOCYTOPENIA: Chronic | Status: ACTIVE | Noted: 2023-12-20

## 2024-04-02 PROBLEM — E11.59 HYPERTENSION COMPLICATING DIABETES: Chronic | Status: ACTIVE | Noted: 2019-09-26

## 2024-04-02 PROBLEM — E11.9 TYPE 2 DIABETES MELLITUS WITHOUT COMPLICATION, WITHOUT LONG-TERM CURRENT USE OF INSULIN: Chronic | Status: ACTIVE | Noted: 2022-04-25

## 2024-04-02 PROBLEM — I15.2 HYPERTENSION COMPLICATING DIABETES: Chronic | Status: ACTIVE | Noted: 2019-09-26

## 2024-04-02 PROBLEM — Z53.20 REFUSAL OF STATIN MEDICATION BY PATIENT: Chronic | Status: ACTIVE | Noted: 2023-05-22

## 2024-04-02 PROBLEM — G89.29 CHRONIC PAIN OF LEFT KNEE: Chronic | Status: ACTIVE | Noted: 2022-04-22

## 2024-04-02 PROBLEM — E66.01 CLASS 3 SEVERE OBESITY DUE TO EXCESS CALORIES WITH SERIOUS COMORBIDITY AND BODY MASS INDEX (BMI) OF 40.0 TO 44.9 IN ADULT: Chronic | Status: ACTIVE | Noted: 2023-05-19

## 2024-04-02 PROBLEM — E78.5 TYPE 2 DIABETES MELLITUS WITH HYPERLIPIDEMIA: Chronic | Status: ACTIVE | Noted: 2023-05-21

## 2024-04-02 PROCEDURE — 99214 OFFICE O/P EST MOD 30 MIN: CPT | Mod: S$GLB,,, | Performed by: FAMILY MEDICINE

## 2024-04-02 RX ORDER — COLCHICINE 0.6 MG/1
TABLET ORAL
Qty: 6 TABLET | Refills: 0 | Status: SHIPPED | OUTPATIENT
Start: 2024-04-02

## 2024-04-02 RX ORDER — ROSUVASTATIN CALCIUM 20 MG/1
20 TABLET, COATED ORAL
COMMUNITY
Start: 2023-12-12 | End: 2024-06-09

## 2024-04-02 NOTE — PATIENT INSTRUCTIONS
Rx colchicine for gout attack, take as instructed  May continue indocin as needed  Take both these medicine after some food  Follow up if not better in 3 days  Work excuse

## 2024-04-02 NOTE — LETTER
April 2, 2024      [facility]  [address], [address]  [place] [address]  Phone: [phone]       Patient: Pedro Costa   YOB: 1990  Date of Visit: 04/02/2024    To Whom It May Concern:    Alf Costa  was at [facility] on 04/02/2024. The patient may return to work/school on 4/4/24 with no restrictions. If you have any questions or concerns, or if I can be of further assistance, please do not hesitate to contact me.    Sincerely,    Tariq Fonseca MD

## 2024-04-02 NOTE — PROGRESS NOTES
"Subjective:      Patient ID: Pedro Costa is a 33 y.o. male.    Vitals:  height is 5' 7" (1.702 m) and weight is 122.5 kg (270 lb). His oral temperature is 98.1 °F (36.7 °C). His blood pressure is 132/76 and his pulse is 88. His respiration is 18 and oxygen saturation is 98%.     Chief Complaint: Knee Pain (Pt states left knee pain/swelling  x 3 days )    33 year old , Pt states left knee pain.Pt states knee has some swelling x 3 days . Pt states no falls/injury to knee.   Pt took OTC tylenol with not much relief. Pt denies sob,cp,fever,n/v/d.  Hx of chronic left knee pain, hx of meniscus repair left knee, hx of gout in toes never in knees, states this feels like gout pain, burning. Different from arthritis pain.  Pt appears well versed in gout management    Knee Pain   The incident occurred 3 to 5 days ago. The incident occurred at home. There was no injury mechanism. The pain is present in the left knee. The quality of the pain is described as aching. The pain is at a severity of 7/10. The pain is moderate. The pain has been Constant since onset. Associated symptoms include an inability to bear weight and muscle weakness. Pertinent negatives include no loss of motion, loss of sensation, numbness or tingling. He reports no foreign bodies present. The symptoms are aggravated by movement, palpation and weight bearing. He has tried acetaminophen, ice, rest and non-weight bearing for the symptoms. The treatment provided no relief.       Neurological:  Negative for numbness.      Objective:     Physical Exam   Constitutional: He is oriented to person, place, and time.  Non-toxic appearance. No distress.   Cardiovascular: Normal rate.   Musculoskeletal:      Comments: Left knee swollen, warm, painful, reduced ROM   Neurological: He is oriented to person, place, and time.   Nursing note and vitals reviewed.      Assessment:     1. Acute idiopathic gout of left knee - first episode in a knee   2. Acute pain of left knee  "   3. Chronic idiopathic gout involving toe of right foot without tophus        Plan:       Acute idiopathic gout of left knee  -     colchicine (COLCRYS) 0.6 mg tablet; Take 2 tablets once, then 1 tablet in 1 hour. May repeat the next day  Dispense: 6 tablet; Refill: 0    Acute pain of left knee  -     colchicine (COLCRYS) 0.6 mg tablet; Take 2 tablets once, then 1 tablet in 1 hour. May repeat the next day  Dispense: 6 tablet; Refill: 0    Chronic idiopathic gout involving toe of right foot without tophus      Rx colchicine for gout attack, take as instructed  May continue indocin as needed  Take both these medicine after some food  Follow up if not better in 3 days  Work excuse

## 2024-04-03 ENCOUNTER — TELEPHONE (OUTPATIENT)
Dept: URGENT CARE | Facility: CLINIC | Age: 34
End: 2024-04-03
Payer: COMMERCIAL

## 2024-04-03 NOTE — TELEPHONE ENCOUNTER
GOUT Patient Instructions:        -- TAKE YOUR indomethacin RX for inflammation/pain.  DO NOT TAKE THIS MEDICATION IF YOU HAVE ISSUES WITH GI BLEEDS.       --Please avoid purine rich foods (decrease meat intake, high purine vegetables, alcohol, high fructose corn syrup, avoid HCTZ and loop diuretics)  --cherry juice may prevent are decreased attacks    *Please follow up with your primary care doctor for further treatment if your symptoms do not improve.      If your symptoms worsen in the mean time (numbness to the area, inability to use your extremity, severely worsening pain or swelling, fever, blood in your stool or dark brown stool) please go to the emergency room for further treatment.       - You must understand that you have received an Urgent Care treatment only and that you may be released before all of your medical problems are known or treated.   - You, the patient, will arrange for follow up care as instructed with your primary care provider or recommended specialist.   - If your condition worsens or fails to improve we recommend that you receive another evaluation at the ER immediately or contact your PCP to discuss your concerns, or return here.   - Please do not drive or make any important decisions for 24 hours if you have received any pain medications, sedatives or mood altering drugs during your visit.    Disclaimer: This document was drafted with the use of a voice recognition device and is likely to have sound alike errors.